# Patient Record
Sex: FEMALE | Race: WHITE | NOT HISPANIC OR LATINO | Employment: FULL TIME | URBAN - METROPOLITAN AREA
[De-identification: names, ages, dates, MRNs, and addresses within clinical notes are randomized per-mention and may not be internally consistent; named-entity substitution may affect disease eponyms.]

---

## 2017-08-04 ENCOUNTER — PATIENT OUTREACH (OUTPATIENT)
Dept: ADMINISTRATIVE | Facility: HOSPITAL | Age: 52
End: 2017-08-04

## 2017-08-04 NOTE — PROGRESS NOTES
Assisted to schedule annual exam, reminder slip mailed to home.  Well woman exams completed per GYN Dec/Jan-reports requested.

## 2017-08-04 NOTE — LETTER
August 4, 2017    Dr Yovani Hernandez MD  [address], [address], [place] [address]             [facility] [address]  [place] [address]  Phone: [phone] August 4, 2017     Patient: Mini Aleman    YOB: 1965   Date of Visit: 8/4/2017         To whom it may concern       We are seeing Mini VILLAVICENCIO Ash, YOB: 1965, at [facility]. Rafaela Liz MD is their primary care physician. To help with our [place] maintenance records could you please send the following:     Most recent Mammogram and Pap Result.      Please send fax to [phone].    Thank-you in advance for your assistance. If you have any questions or concerns, please don't hesitate to contact me at [phone].     Sara MELENDEZ LPN Care Coordination   [facility]  Phone [phone] ext [phone],  Fax [phone]

## 2017-08-30 ENCOUNTER — OFFICE VISIT (OUTPATIENT)
Dept: INTERNAL MEDICINE | Facility: CLINIC | Age: 52
End: 2017-08-30
Payer: COMMERCIAL

## 2017-08-30 VITALS
HEIGHT: 67 IN | SYSTOLIC BLOOD PRESSURE: 138 MMHG | WEIGHT: 243.19 LBS | DIASTOLIC BLOOD PRESSURE: 80 MMHG | TEMPERATURE: 99 F | HEART RATE: 67 BPM | BODY MASS INDEX: 38.17 KG/M2 | OXYGEN SATURATION: 97 %

## 2017-08-30 DIAGNOSIS — Z12.11 COLON CANCER SCREENING: ICD-10-CM

## 2017-08-30 DIAGNOSIS — Z11.59 NEED FOR HEPATITIS C SCREENING TEST: ICD-10-CM

## 2017-08-30 DIAGNOSIS — I49.5 SSS (SICK SINUS SYNDROME): ICD-10-CM

## 2017-08-30 DIAGNOSIS — E66.9 OBESITY (BMI 30-39.9): ICD-10-CM

## 2017-08-30 DIAGNOSIS — I10 ESSENTIAL HYPERTENSION: ICD-10-CM

## 2017-08-30 DIAGNOSIS — Z13.220 SCREENING FOR LIPOID DISORDERS: ICD-10-CM

## 2017-08-30 DIAGNOSIS — Z00.00 ROUTINE GENERAL MEDICAL EXAMINATION AT A HEALTH CARE FACILITY: Primary | ICD-10-CM

## 2017-08-30 DIAGNOSIS — Z13.29 THYROID DISORDER SCREEN: ICD-10-CM

## 2017-08-30 DIAGNOSIS — E88.819 INSULIN RESISTANCE: ICD-10-CM

## 2017-08-30 DIAGNOSIS — G43.009 MIGRAINE WITHOUT AURA AND WITHOUT STATUS MIGRAINOSUS, NOT INTRACTABLE: ICD-10-CM

## 2017-08-30 PROCEDURE — 99386 PREV VISIT NEW AGE 40-64: CPT | Mod: S$GLB,,, | Performed by: FAMILY MEDICINE

## 2017-08-30 PROCEDURE — 3008F BODY MASS INDEX DOCD: CPT | Mod: ,,, | Performed by: FAMILY MEDICINE

## 2017-08-30 PROCEDURE — 99999 PR PBB SHADOW E&M-EST. PATIENT-LVL III: CPT | Mod: PBBFAC,,, | Performed by: FAMILY MEDICINE

## 2017-08-30 RX ORDER — SUMATRIPTAN SUCCINATE 100 MG/1
100 TABLET ORAL
COMMUNITY
Start: 2016-05-05 | End: 2018-01-08 | Stop reason: SDUPTHER

## 2017-08-30 RX ORDER — METOPROLOL SUCCINATE 50 MG/1
50 TABLET, EXTENDED RELEASE ORAL DAILY
COMMUNITY
End: 2017-08-30

## 2017-08-30 RX ORDER — ESTRADIOL 2 MG/1
2 TABLET ORAL DAILY
COMMUNITY
End: 2018-01-08

## 2017-08-30 RX ORDER — METFORMIN HYDROCHLORIDE 1000 MG/1
1000 TABLET ORAL
COMMUNITY
End: 2018-03-07

## 2017-09-02 ENCOUNTER — LAB VISIT (OUTPATIENT)
Dept: LAB | Facility: HOSPITAL | Age: 52
End: 2017-09-02
Attending: FAMILY MEDICINE
Payer: COMMERCIAL

## 2017-09-02 DIAGNOSIS — Z13.220 SCREENING FOR LIPOID DISORDERS: ICD-10-CM

## 2017-09-02 DIAGNOSIS — E88.819 INSULIN RESISTANCE: ICD-10-CM

## 2017-09-02 DIAGNOSIS — Z13.29 THYROID DISORDER SCREEN: ICD-10-CM

## 2017-09-02 DIAGNOSIS — Z00.00 ROUTINE GENERAL MEDICAL EXAMINATION AT A HEALTH CARE FACILITY: ICD-10-CM

## 2017-09-02 PROBLEM — I49.5 SSS (SICK SINUS SYNDROME): Status: ACTIVE | Noted: 2017-09-02

## 2017-09-02 PROBLEM — G43.009 MIGRAINE WITHOUT AURA AND WITHOUT STATUS MIGRAINOSUS, NOT INTRACTABLE: Status: ACTIVE | Noted: 2017-09-02

## 2017-09-02 LAB
ALBUMIN SERPL BCP-MCNC: 3.6 G/DL
ALP SERPL-CCNC: 49 U/L
ALT SERPL W/O P-5'-P-CCNC: 13 U/L
ANION GAP SERPL CALC-SCNC: 12 MMOL/L
AST SERPL-CCNC: 16 U/L
BASOPHILS # BLD AUTO: 0.03 K/UL
BASOPHILS NFR BLD: 0.5 %
BILIRUB SERPL-MCNC: 0.4 MG/DL
BUN SERPL-MCNC: 14 MG/DL
CALCIUM SERPL-MCNC: 9.4 MG/DL
CHLORIDE SERPL-SCNC: 103 MMOL/L
CHOLEST SERPL-MCNC: 231 MG/DL
CHOLEST/HDLC SERPL: 3.3 {RATIO}
CO2 SERPL-SCNC: 27 MMOL/L
CREAT SERPL-MCNC: 0.9 MG/DL
DIFFERENTIAL METHOD: NORMAL
EOSINOPHIL # BLD AUTO: 0.1 K/UL
EOSINOPHIL NFR BLD: 1.3 %
ERYTHROCYTE [DISTWIDTH] IN BLOOD BY AUTOMATED COUNT: 13.2 %
EST. GFR  (AFRICAN AMERICAN): >60 ML/MIN/1.73 M^2
EST. GFR  (NON AFRICAN AMERICAN): >60 ML/MIN/1.73 M^2
ESTIMATED AVG GLUCOSE: 117 MG/DL
GLUCOSE SERPL-MCNC: 112 MG/DL
HBA1C MFR BLD HPLC: 5.7 %
HCT VFR BLD AUTO: 38.2 %
HDLC SERPL-MCNC: 69 MG/DL
HDLC SERPL: 29.9 %
HGB BLD-MCNC: 12.8 G/DL
LDLC SERPL CALC-MCNC: 144.8 MG/DL
LYMPHOCYTES # BLD AUTO: 2.1 K/UL
LYMPHOCYTES NFR BLD: 33.7 %
MCH RBC QN AUTO: 29.2 PG
MCHC RBC AUTO-ENTMCNC: 33.5 G/DL
MCV RBC AUTO: 87 FL
MONOCYTES # BLD AUTO: 0.4 K/UL
MONOCYTES NFR BLD: 6 %
NEUTROPHILS # BLD AUTO: 3.7 K/UL
NEUTROPHILS NFR BLD: 58.3 %
NONHDLC SERPL-MCNC: 162 MG/DL
PLATELET # BLD AUTO: 304 K/UL
PMV BLD AUTO: 10.3 FL
POTASSIUM SERPL-SCNC: 3.7 MMOL/L
PROT SERPL-MCNC: 7.1 G/DL
RBC # BLD AUTO: 4.38 M/UL
SODIUM SERPL-SCNC: 142 MMOL/L
TRIGL SERPL-MCNC: 86 MG/DL
TSH SERPL DL<=0.005 MIU/L-ACNC: 1.17 UIU/ML
WBC # BLD AUTO: 6.29 K/UL

## 2017-09-02 PROCEDURE — 84443 ASSAY THYROID STIM HORMONE: CPT

## 2017-09-02 PROCEDURE — 85025 COMPLETE CBC W/AUTO DIFF WBC: CPT

## 2017-09-02 PROCEDURE — 83036 HEMOGLOBIN GLYCOSYLATED A1C: CPT

## 2017-09-02 PROCEDURE — 80053 COMPREHEN METABOLIC PANEL: CPT

## 2017-09-02 PROCEDURE — 36415 COLL VENOUS BLD VENIPUNCTURE: CPT | Mod: PO

## 2017-09-02 PROCEDURE — 80061 LIPID PANEL: CPT

## 2017-09-03 ENCOUNTER — TELEPHONE (OUTPATIENT)
Dept: INTERNAL MEDICINE | Facility: CLINIC | Age: 52
End: 2017-09-03

## 2017-09-03 DIAGNOSIS — E78.00 PURE HYPERCHOLESTEROLEMIA: Primary | ICD-10-CM

## 2017-09-03 NOTE — PROGRESS NOTES
Subjective:       Patient ID: Mini Aleman is a 52 y.o. female.    Chief Complaint: Establish Care    Patient presents to clinic today for re-establish care physical.      Review of Systems   Constitutional: Negative for chills, fatigue, fever and unexpected weight change.   HENT: Negative for congestion, dental problem, ear pain, hearing loss, rhinorrhea and trouble swallowing.    Eyes: Negative for pain and visual disturbance.   Respiratory: Negative for cough and shortness of breath.    Cardiovascular: Positive for chest pain (per patient might be heartburn; followed by Dr. Oro, Cardiology), palpitations (chronic, stable) and leg swelling (chronic, stable).   Gastrointestinal: Negative for abdominal distention, abdominal pain, blood in stool, constipation, diarrhea, nausea and vomiting.   Genitourinary: Negative for difficulty urinating and vaginal discharge.   Musculoskeletal: Positive for arthralgias (chronic). Negative for myalgias.   Skin: Negative for rash.   Neurological: Positive for dizziness and headaches (migraines). Negative for weakness and numbness.   Hematological: Negative for adenopathy. Does not bruise/bleed easily.   Psychiatric/Behavioral: Negative for dysphoric mood and sleep disturbance. The patient is not nervous/anxious.        Objective:      Physical Exam   Constitutional: She is oriented to person, place, and time. Vital signs are normal. She appears well-developed and well-nourished. No distress.   HENT:   Head: Normocephalic and atraumatic.   Right Ear: Tympanic membrane, external ear and ear canal normal.   Left Ear: Tympanic membrane, external ear and ear canal normal.   Nose: Nose normal. No mucosal edema or rhinorrhea.   Mouth/Throat: Uvula is midline, oropharynx is clear and moist and mucous membranes are normal.   Eyes: Conjunctivae, EOM and lids are normal. Pupils are equal, round, and reactive to light.   Neck: Normal range of motion. Neck supple. No thyromegaly present.    Cardiovascular: Normal rate and regular rhythm.  Exam reveals no gallop and no friction rub.    No murmur heard.  Pulmonary/Chest: Effort normal and breath sounds normal. She has no wheezes. She has no rhonchi. She has no rales.   Abdominal: Soft. Normal appearance and bowel sounds are normal. She exhibits no distension and no mass. There is no hepatosplenomegaly. There is no tenderness.   Musculoskeletal: Normal range of motion.   Lymphadenopathy:     She has no cervical adenopathy.   Neurological: She is alert and oriented to person, place, and time. She has normal strength. No cranial nerve deficit or sensory deficit. Gait normal.   Reflex Scores:       Patellar reflexes are 2+ on the right side and 2+ on the left side.  Skin: Skin is warm and dry. No lesion and no rash noted. No cyanosis. Nails show no clubbing.   Psychiatric: She has a normal mood and affect.   Vitals reviewed.      Assessment:       1. Routine general medical examination at a health care facility    2. Essential hypertension    3. Insulin resistance    4. SSS (sick sinus syndrome)    5. Migraine without aura and without status migrainosus, not intractable    6. Obesity (BMI 30-39.9)    7. Colon cancer screening    8. Thyroid disorder screen    9. Screening for lipoid disorders    10. Need for hepatitis C screening test        Plan:     Problem List Items Addressed This Visit     Insulin resistance    Current Assessment & Plan     Patient has been off metformin x 1 year, status pending lab, will resume metformin if needed         Relevant Orders    Hemoglobin A1c    Obesity (BMI 30-39.9)    Current Assessment & Plan     Discussed the importance of weight loss in preventing chronic disease and cancers. Advised lifestyle modifications including diet and exercise to lose weight. Patient expressed understanding.           Essential hypertension    Current Assessment & Plan     Controlled on dutoprol, followed by Dr. Oro, Cardiology          Relevant Orders    Basic metabolic panel    SSS (sick sinus syndrome)    Overview     Followed by Dr. Oro, Cardiology         Migraine without aura and without status migrainosus, not intractable    Overview     Followed by Dr. Biggs, Neurology           Other Visit Diagnoses     Routine general medical examination at a health care facility    -  Primary    Relevant Orders    Comprehensive metabolic panel    CBC auto differential    Colon cancer screening        Relevant Orders    Case request GI: COLONOSCOPY (Completed)    Thyroid disorder screen        Relevant Orders    TSH    Screening for lipoid disorders        Relevant Orders    Lipid panel    Need for hepatitis C screening test        Relevant Orders    Hepatitis C antibody          Health Maintenance reviewed/updated. Advised to obtain flu vaccine this Fall. Patient will check on last Tdap.  Female health screenings per OB/GYN- Dr. Yovani Hernandez, Release signed for records.

## 2017-09-03 NOTE — ASSESSMENT & PLAN NOTE
Discussed the importance of weight loss in preventing chronic disease and cancers. Advised lifestyle modifications including diet and exercise to lose weight. Patient expressed understanding.

## 2017-09-04 NOTE — TELEPHONE ENCOUNTER
Labs show elevated blood sugar and increase in a1c, recommend we resume metformin if patient is in agreement, please send eRx to preferred pharmacy. Total cholesterol is elevated, please work on diet changes to improve this. Recommend we recheck in 6 months - please schedule cmp/lipid in 6 months.

## 2017-09-19 ENCOUNTER — TELEPHONE (OUTPATIENT)
Dept: INTERNAL MEDICINE | Facility: CLINIC | Age: 52
End: 2017-09-19

## 2017-09-19 NOTE — TELEPHONE ENCOUNTER
Unable to reach pt for lab results after several attempts. Letter mailed requesting her to contact office for results.

## 2018-01-08 ENCOUNTER — OFFICE VISIT (OUTPATIENT)
Dept: INTERNAL MEDICINE | Facility: CLINIC | Age: 53
End: 2018-01-08
Payer: COMMERCIAL

## 2018-01-08 VITALS
SYSTOLIC BLOOD PRESSURE: 130 MMHG | TEMPERATURE: 97 F | WEIGHT: 249.13 LBS | HEART RATE: 76 BPM | OXYGEN SATURATION: 96 % | HEIGHT: 67 IN | BODY MASS INDEX: 39.1 KG/M2 | DIASTOLIC BLOOD PRESSURE: 80 MMHG

## 2018-01-08 DIAGNOSIS — R05.9 COUGH: Primary | ICD-10-CM

## 2018-01-08 DIAGNOSIS — G43.009 MIGRAINE WITHOUT AURA AND WITHOUT STATUS MIGRAINOSUS, NOT INTRACTABLE: ICD-10-CM

## 2018-01-08 PROCEDURE — 99999 PR PBB SHADOW E&M-EST. PATIENT-LVL III: CPT | Mod: PBBFAC,,, | Performed by: FAMILY MEDICINE

## 2018-01-08 PROCEDURE — 99213 OFFICE O/P EST LOW 20 MIN: CPT | Mod: S$GLB,,, | Performed by: FAMILY MEDICINE

## 2018-01-08 RX ORDER — CODEINE PHOSPHATE AND GUAIFENESIN 10; 100 MG/5ML; MG/5ML
5 SOLUTION ORAL EVERY 8 HOURS PRN
Qty: 118 ML | Refills: 0 | Status: SHIPPED | OUTPATIENT
Start: 2018-01-08 | End: 2018-01-18

## 2018-01-08 RX ORDER — SUMATRIPTAN SUCCINATE 100 MG/1
100 TABLET ORAL
Qty: 9 TABLET | Refills: 1 | Status: SHIPPED | OUTPATIENT
Start: 2018-01-08 | End: 2018-03-07 | Stop reason: SDUPTHER

## 2018-01-08 RX ORDER — ESTRADIOL 1 MG/1
TABLET ORAL
Refills: 11 | COMMUNITY
Start: 2017-12-23 | End: 2022-02-26

## 2018-01-08 NOTE — PATIENT INSTRUCTIONS

## 2018-01-08 NOTE — PROGRESS NOTES
Mini Aleman  01/08/2018  [id number]    Rafaela Liz MD  Patient Care Team:  Rafaela Liz MD as PCP - General (Family Medicine)  Yovani Hernandez MD as Obstetrician (Obstetrics)  Has the patient seen any provider outside of the [facility] network since the last visit? (no). If yes, HIPPA forms completed and records requested.        Visit Type:an urgent visit for a new problem    Chief Complaint:  Chief Complaint   Patient presents with    Cough     Sneezing,Headaches,Since Wednesday,Chest Congestion,Runny Nose,Achy All Over,Ears Stopped Up,Took Nyquil,Nasal Spray    Medication Refill     Imitrex       History of Present Illness:  52 year old here for cold sx since last Wednesday, 5 days.  Cold, Congestion in chest, rhinorrhea, achy feeling.  Took OTC nyquil and OTC nasal spray.  Cough is dry, feels that its in chest  She reports wheeze with cough, but not without.  Denies SOB.  No fever.    She has history of Migraines. Neurology had followed. She is treated with prn Imitrex. Requesting refills.          History:  Past Medical History:   Diagnosis Date    Diabetes mellitus, type 2     Insulin resistance     Migraines     Sick sinus syndrome with tachycardia     followed by Dr. Oro, Cardiology annually     Past Surgical History:   Procedure Laterality Date    TOTAL ABDOMINAL HYSTERECTOMY  06/2012     Family History   Problem Relation Age of Onset    Cancer Mother      non hodskins lymphoma    Diabetes Mother     Diabetes Father     Heart disease Father     Birth defects Father     Diabetes Sister     No Known Problems Brother      Social History     Social History    Marital status:      Spouse name: N/A    Number of children: N/A    Years of education: N/A     Occupational History     [employer]      Social History Main Topics    Smoking status: Never Smoker    Smokeless tobacco: Never Used    Alcohol use No    Drug use: No    Sexual activity: Yes      Other Topics Concern    Not on file     Social History Narrative    No narrative on file     Patient Active Problem List   Diagnosis    Insulin resistance    Obesity (BMI 30-39.9)    Essential hypertension    SSS (sick sinus syndrome)    Migraine without aura and without status migrainosus, not intractable     Review of patient's allergies indicates:   Allergen Reactions    Cortisone Rash       The following were reviewed at this visit: active problem list, medication list, allergies, family history, social history, and health maintenance.    Medications:  Current Outpatient Prescriptions on File Prior to Visit   Medication Sig Dispense Refill    metoprolol dhillon-hydrochlorothiaz (DUTOPROL) 25-12.5 mg Tb24 Take 0.5 tablets by mouth once daily.      [DISCONTINUED] estradiol (ESTRACE) 2 MG tablet Take 2 mg by mouth once daily.      [DISCONTINUED] sumatriptan (IMITREX) 100 MG tablet Take 100 mg by mouth as needed.      metformin (GLUCOPHAGE) 1000 MG tablet Take 1,000 mg by mouth.       No current facility-administered medications on file prior to visit.        Medications have been reviewed and reconciled with patient at this visit.  Barriers to medications present (no)    Adverse reactions to current medications (no)    Over the counter medications reviewed (Yes ), and if needed added to active Medication list at this visit.     Exam:  Wt Readings from Last 3 Encounters:   01/08/18 113 kg (249 lb 1.6 oz)   08/30/17 110.3 kg (243 lb 2.7 oz)   08/01/14 107.7 kg (237 lb 6.4 oz)     Temp Readings from Last 3 Encounters:   01/08/18 97.4 °F (36.3 °C) (Tympanic)   08/30/17 98.5 °F (36.9 °C) (Tympanic)   08/01/14 97.1 °F (36.2 °C) (Tympanic)     BP Readings from Last 3 Encounters:   01/08/18 130/80   08/30/17 138/80   08/01/14 128/76     Pulse Readings from Last 3 Encounters:   01/08/18 76   08/30/17 67   08/01/14 80     Body mass index is 39.01 kg/m².      Review of Systems   Constitutional: Positive for chills.  Negative for fever.   HENT: Positive for congestion. Negative for sinus pain and sore throat.    Eyes: Negative for blurred vision and double vision.   Respiratory: Positive for cough and sputum production. Negative for shortness of breath and wheezing.    Cardiovascular: Negative for chest pain, palpitations and leg swelling.   Gastrointestinal: Negative for abdominal pain, constipation, diarrhea, heartburn, nausea and vomiting.   Genitourinary: Negative.    Musculoskeletal: Negative.    Skin: Negative.  Negative for rash.   Neurological: Negative.    Endo/Heme/Allergies: Negative.  Negative for polydipsia. Does not bruise/bleed easily.   Psychiatric/Behavioral: Negative for depression and substance abuse.       Physical Exam   Constitutional: She is oriented to person, place, and time. She appears well-developed and well-nourished. No distress.   HENT:   Head: Normocephalic and atraumatic.   Right Ear: External ear normal.   Left Ear: External ear normal.   Nose: Nose normal.   Mouth/Throat: Oropharynx is clear and moist. No oropharyngeal exudate.   Eyes: Conjunctivae and EOM are normal. Pupils are equal, round, and reactive to light. Right eye exhibits no discharge. Left eye exhibits no discharge.   Neck: Normal range of motion. Neck supple. No thyromegaly present.   Cardiovascular: Normal rate, regular rhythm, normal heart sounds and intact distal pulses.    No murmur heard.  Pulmonary/Chest: Effort normal and breath sounds normal. No respiratory distress. She has no wheezes.   Abdominal: Soft. Bowel sounds are normal. She exhibits no distension and no mass. There is no tenderness.   Musculoskeletal: Normal range of motion. She exhibits no edema.   Lymphadenopathy:     She has no cervical adenopathy.   Neurological: She is alert and oriented to person, place, and time. No cranial nerve deficit.   Skin: Capillary refill takes less than 2 seconds. She is not diaphoretic.   Psychiatric: She has a normal mood and  affect. Her behavior is normal. Judgment and thought content normal.   Nursing note and vitals reviewed.      Laboratory Reviewed ({N/A)  Lab Results   Component Value Date    WBC 6.29 09/02/2017    HGB 12.8 09/02/2017    HCT 38.2 09/02/2017     09/02/2017    CHOL 231 (H) 09/02/2017    TRIG 86 09/02/2017    HDL 69 09/02/2017    ALT 13 09/02/2017    AST 16 09/02/2017     09/02/2017    K 3.7 09/02/2017     09/02/2017    CREATININE 0.9 09/02/2017    BUN 14 09/02/2017    CO2 27 09/02/2017    TSH 1.174 09/02/2017    HGBA1C 5.7 (H) 09/02/2017       Assessment:  The primary encounter diagnosis was Cough. A diagnosis of Migraine without aura and without status migrainosus, not intractable was also pertinent to this visit.     Plan       Cough  -     guaifenesin-codeine 100-10 mg/5 ml (TUSSI-ORGANIDIN NR)  mg/5 mL syrup; Take 5 mLs by mouth every 8 (eight) hours as needed for Cough.  Dispense: 118 mL; Refill: 0    Migraine without aura and without status migrainosus, not intractable  -     sumatriptan (IMITREX) 100 MG tablet; Take 1 tablet (100 mg total) by mouth as needed.  Dispense: 9 tablet; Refill: 1      Care Plan/Goals: Reviewed  (N/A)  Goals     None          Follow up: Return if symptoms worsen or fail to improve.    After visit summary was printed and given to patient upon discharge today.  Patient goals and care plan are included in After Visit Summary.   Statement Selected

## 2018-02-21 ENCOUNTER — PATIENT OUTREACH (OUTPATIENT)
Dept: ADMINISTRATIVE | Facility: HOSPITAL | Age: 53
End: 2018-02-21

## 2018-03-03 ENCOUNTER — LAB VISIT (OUTPATIENT)
Dept: LAB | Facility: HOSPITAL | Age: 53
End: 2018-03-03
Attending: FAMILY MEDICINE
Payer: COMMERCIAL

## 2018-03-03 DIAGNOSIS — E88.819 INSULIN RESISTANCE: ICD-10-CM

## 2018-03-03 DIAGNOSIS — I10 ESSENTIAL HYPERTENSION: ICD-10-CM

## 2018-03-03 LAB
ANION GAP SERPL CALC-SCNC: 9 MMOL/L
BUN SERPL-MCNC: 13 MG/DL
CALCIUM SERPL-MCNC: 9.4 MG/DL
CHLORIDE SERPL-SCNC: 103 MMOL/L
CO2 SERPL-SCNC: 28 MMOL/L
CREAT SERPL-MCNC: 0.8 MG/DL
EST. GFR  (AFRICAN AMERICAN): >60 ML/MIN/1.73 M^2
EST. GFR  (NON AFRICAN AMERICAN): >60 ML/MIN/1.73 M^2
ESTIMATED AVG GLUCOSE: 120 MG/DL
GLUCOSE SERPL-MCNC: 116 MG/DL
HBA1C MFR BLD HPLC: 5.8 %
POTASSIUM SERPL-SCNC: 3.9 MMOL/L
SODIUM SERPL-SCNC: 140 MMOL/L

## 2018-03-03 PROCEDURE — 80048 BASIC METABOLIC PNL TOTAL CA: CPT

## 2018-03-03 PROCEDURE — 36415 COLL VENOUS BLD VENIPUNCTURE: CPT | Mod: PO

## 2018-03-03 PROCEDURE — 83036 HEMOGLOBIN GLYCOSYLATED A1C: CPT

## 2018-03-07 ENCOUNTER — OFFICE VISIT (OUTPATIENT)
Dept: INTERNAL MEDICINE | Facility: CLINIC | Age: 53
End: 2018-03-07
Payer: COMMERCIAL

## 2018-03-07 VITALS
HEIGHT: 67 IN | SYSTOLIC BLOOD PRESSURE: 130 MMHG | OXYGEN SATURATION: 96 % | HEART RATE: 82 BPM | TEMPERATURE: 98 F | WEIGHT: 251.75 LBS | DIASTOLIC BLOOD PRESSURE: 80 MMHG | BODY MASS INDEX: 39.51 KG/M2

## 2018-03-07 DIAGNOSIS — Z12.11 COLON CANCER SCREENING: ICD-10-CM

## 2018-03-07 DIAGNOSIS — E66.9 OBESITY (BMI 30-39.9): ICD-10-CM

## 2018-03-07 DIAGNOSIS — Z23 NEED FOR TETANUS BOOSTER: ICD-10-CM

## 2018-03-07 DIAGNOSIS — Z11.59 ENCOUNTER FOR HEPATITIS C SCREENING TEST FOR LOW RISK PATIENT: Primary | ICD-10-CM

## 2018-03-07 DIAGNOSIS — I10 ESSENTIAL HYPERTENSION: ICD-10-CM

## 2018-03-07 DIAGNOSIS — G43.009 MIGRAINE WITHOUT AURA AND WITHOUT STATUS MIGRAINOSUS, NOT INTRACTABLE: ICD-10-CM

## 2018-03-07 PROCEDURE — 90471 IMMUNIZATION ADMIN: CPT | Mod: S$GLB,,, | Performed by: FAMILY MEDICINE

## 2018-03-07 PROCEDURE — 99214 OFFICE O/P EST MOD 30 MIN: CPT | Mod: 25,S$GLB,, | Performed by: NURSE PRACTITIONER

## 2018-03-07 PROCEDURE — 99999 PR PBB SHADOW E&M-EST. PATIENT-LVL III: CPT | Mod: PBBFAC,,, | Performed by: NURSE PRACTITIONER

## 2018-03-07 PROCEDURE — 3075F SYST BP GE 130 - 139MM HG: CPT | Mod: S$GLB,,, | Performed by: NURSE PRACTITIONER

## 2018-03-07 PROCEDURE — 3079F DIAST BP 80-89 MM HG: CPT | Mod: S$GLB,,, | Performed by: NURSE PRACTITIONER

## 2018-03-07 PROCEDURE — 90715 TDAP VACCINE 7 YRS/> IM: CPT | Mod: S$GLB,,, | Performed by: FAMILY MEDICINE

## 2018-03-07 RX ORDER — FLUTICASONE FUROATE AND VILANTEROL TRIFENATATE 100; 25 UG/1; UG/1
POWDER RESPIRATORY (INHALATION)
Refills: 0 | COMMUNITY
Start: 2018-02-01 | End: 2021-03-03

## 2018-03-07 RX ORDER — SUMATRIPTAN SUCCINATE 100 MG/1
100 TABLET ORAL
Qty: 9 TABLET | Refills: 1 | Status: SHIPPED | OUTPATIENT
Start: 2018-03-07 | End: 2023-10-03 | Stop reason: SDUPTHER

## 2018-03-07 NOTE — PROGRESS NOTES
Mini Aleman  03/07/2018  [id number]    Rafaela Liz MD  Patient Care Team:  Rafaela Liz MD as PCP - General (Family Medicine)  Yovani Hernandez MD as Obstetrician (Obstetrics)  Has the patient seen any provider outside of the [facility] network since the last visit? (no). If yes, HIPPA forms completed and records requested.        Visit Type:a scheduled routine follow-up visit    Chief Complaint:  Chief Complaint   Patient presents with    6 month follow up       History of Present Illness:    Patient presents to clinic today for follow up of chronic conditions including HTN and migraine. She is otherwise without concern.    History:  Past Medical History:   Diagnosis Date    Diabetes mellitus, type 2     Insulin resistance     Migraines     Sick sinus syndrome with tachycardia     followed by Dr. Oro, Cardiology annually     Past Surgical History:   Procedure Laterality Date    TOTAL ABDOMINAL HYSTERECTOMY  06/2012     Family History   Problem Relation Age of Onset    Cancer Mother      non hodskins lymphoma    Diabetes Mother     Diabetes Father     Heart disease Father     Birth defects Father     Diabetes Sister     No Known Problems Brother      Social History     Social History    Marital status:      Spouse name: N/A    Number of children: 2    Years of education: N/A     Occupational History     [employer]      Social History Main Topics    Smoking status: Never Smoker    Smokeless tobacco: Never Used    Alcohol use No    Drug use: No    Sexual activity: Yes     Other Topics Concern    Not on file     Social History Narrative    No narrative on file     Patient Active Problem List   Diagnosis    Insulin resistance    Obesity (BMI 30-39.9)    Essential hypertension    SSS (sick sinus syndrome)    Migraine without aura and without status migrainosus, not intractable     Review of patient's allergies indicates:   Allergen Reactions     Cortisone Rash       The following were reviewed at this visit: active problem list, medication list, allergies, family history, social history, and health maintenance.    Medications:  Current Outpatient Prescriptions on File Prior to Visit   Medication Sig Dispense Refill    estradiol (ESTRACE) 1 MG tablet   11    metoprolol dhillon-hydrochlorothiaz (DUTOPROL) 25-12.5 mg Tb24 Take 0.5 tablets by mouth once daily.      [DISCONTINUED] sumatriptan (IMITREX) 100 MG tablet Take 1 tablet (100 mg total) by mouth as needed. 9 tablet 1    [DISCONTINUED] metformin (GLUCOPHAGE) 1000 MG tablet Take 1,000 mg by mouth.       No current facility-administered medications on file prior to visit.        Medications have been reviewed and reconciled with patient at this visit.  Barriers to medications present (no)    Adverse reactions to current medications (yes)    Over the counter medications reviewed (Yes ), and if needed added to active Medication list at this visit.     Exam:  Wt Readings from Last 3 Encounters:   03/07/18 114.2 kg (251 lb 12.3 oz)   01/08/18 113 kg (249 lb 1.6 oz)   08/30/17 110.3 kg (243 lb 2.7 oz)     Temp Readings from Last 3 Encounters:   03/07/18 98 °F (36.7 °C) (Tympanic)   01/08/18 97.4 °F (36.3 °C) (Tympanic)   08/30/17 98.5 °F (36.9 °C) (Tympanic)     BP Readings from Last 3 Encounters:   03/07/18 130/80   01/08/18 130/80   08/30/17 138/80     Pulse Readings from Last 3 Encounters:   03/07/18 82   01/08/18 76   08/30/17 67     Body mass index is 39.43 kg/m².    Review of Systems   Constitutional: Negative for chills and fever.   HENT: Negative for congestion and sore throat.    Respiratory: Negative for cough.    Cardiovascular: Negative for chest pain and leg swelling.   Musculoskeletal: Negative for myalgias.   Skin: Negative for rash.   Neurological: Positive for headaches (chronic, stable). Negative for weakness.         Physical Exam   Constitutional: She is oriented to person, place, and time.  She appears well-developed and well-nourished. No distress.   HENT:   Head: Normocephalic and atraumatic.   Eyes: Conjunctivae and EOM are normal. Pupils are equal, round, and reactive to light.   Cardiovascular: Normal rate and regular rhythm.  Exam reveals no gallop and no friction rub.    No murmur heard.  Pulmonary/Chest: Effort normal and breath sounds normal.   Neurological: She is alert and oriented to person, place, and time.   Skin: Skin is warm and dry.   Psychiatric: She has a normal mood and affect.   Vitals reviewed.      Laboratory Reviewed ({Yes)  Lab Results   Component Value Date    WBC 6.29 09/02/2017    HGB 12.8 09/02/2017    HCT 38.2 09/02/2017     09/02/2017    CHOL 231 (H) 09/02/2017    TRIG 86 09/02/2017    HDL 69 09/02/2017    ALT 13 09/02/2017    AST 16 09/02/2017     03/03/2018    K 3.9 03/03/2018     03/03/2018    CREATININE 0.8 03/03/2018    BUN 13 03/03/2018    CO2 28 03/03/2018    TSH 1.174 09/02/2017    HGBA1C 5.8 (H) 03/03/2018       Assessment:  The primary encounter diagnosis was Encounter for hepatitis C screening test for low risk patient. Diagnoses of Colon cancer screening, Need for tetanus booster, Migraine without aura and without status migrainosus, not intractable, Essential hypertension, and Obesity (BMI 30-39.9) were also pertinent to this visit.     Plan  Obesity (BMI 30-39.9)  Stable, recently started WW, will resume exercise    Essential hypertension  Controlled, continue metoprolol    Migraine without aura and without status migrainosus, not intractable  Stable, refill imitrex    Ordered colonoscopy.  Patient to have hep c screening with next routine lab draw.  tdap today.  Labs reviewed, questions answered.  Care Plan/Goals: Reviewed  (N/A)  Goals     None          Follow up: Follow-up in about 6 months (around 9/7/2018), or if symptoms worsen or fail to improve, for annual wellness/EPP with Dr. Liz.    After visit summary was printed and given  to patient upon discharge today.  Patient goals and care plan are included in After Visit Summary.

## 2018-03-12 ENCOUNTER — DOCUMENTATION ONLY (OUTPATIENT)
Dept: ENDOSCOPY | Facility: HOSPITAL | Age: 53
End: 2018-03-12

## 2018-07-18 ENCOUNTER — TELEPHONE (OUTPATIENT)
Dept: INTERNAL MEDICINE | Facility: CLINIC | Age: 53
End: 2018-07-18

## 2018-07-18 DIAGNOSIS — Z12.11 COLON CANCER SCREENING: Primary | ICD-10-CM

## 2018-07-18 NOTE — TELEPHONE ENCOUNTER
----- Message from Chacha Blackwood NP sent at 7/2/2018  2:22 PM CDT -----  Regarding: colonoscopy  Patient has not had colonoscopy. If no family history of colon ca, she is a candidate for yearly fitkit. Please contact to see if she is agreeable.

## 2018-07-23 ENCOUNTER — TELEPHONE (OUTPATIENT)
Dept: INTERNAL MEDICINE | Facility: CLINIC | Age: 53
End: 2018-07-23

## 2018-07-23 NOTE — TELEPHONE ENCOUNTER
Left message on patients voicemail advising that the FitKit is ready for pickup. Advised to call the office as needed.

## 2018-07-23 NOTE — TELEPHONE ENCOUNTER
----- Message from Loyd Rodgers sent at 7/23/2018  1:18 PM CDT -----  Contact: Pt.   Pt is returning missed call. .[phone] (vcjo)

## 2018-08-09 ENCOUNTER — LAB VISIT (OUTPATIENT)
Dept: LAB | Facility: HOSPITAL | Age: 53
End: 2018-08-09
Payer: COMMERCIAL

## 2018-08-09 DIAGNOSIS — Z12.11 COLON CANCER SCREENING: ICD-10-CM

## 2018-08-09 LAB — HEMOCCULT STL QL IA: NEGATIVE

## 2018-08-09 PROCEDURE — 82274 ASSAY TEST FOR BLOOD FECAL: CPT

## 2018-10-15 ENCOUNTER — OFFICE VISIT (OUTPATIENT)
Dept: INTERNAL MEDICINE | Facility: CLINIC | Age: 53
End: 2018-10-15
Payer: COMMERCIAL

## 2018-10-15 ENCOUNTER — HOSPITAL ENCOUNTER (OUTPATIENT)
Dept: RADIOLOGY | Facility: HOSPITAL | Age: 53
Discharge: HOME | End: 2018-10-15
Attending: NURSE PRACTITIONER
Payer: COMMERCIAL

## 2018-10-15 ENCOUNTER — OFFICE VISIT (OUTPATIENT)
Dept: ORTHOPEDICS | Facility: CLINIC | Age: 53
End: 2018-10-15
Payer: COMMERCIAL

## 2018-10-15 VITALS
OXYGEN SATURATION: 96 % | DIASTOLIC BLOOD PRESSURE: 78 MMHG | HEART RATE: 84 BPM | WEIGHT: 252.44 LBS | TEMPERATURE: 98 F | HEIGHT: 67 IN | BODY MASS INDEX: 39.62 KG/M2 | SYSTOLIC BLOOD PRESSURE: 130 MMHG

## 2018-10-15 VITALS
WEIGHT: 252.44 LBS | BODY MASS INDEX: 39.62 KG/M2 | HEART RATE: 85 BPM | HEIGHT: 67 IN | SYSTOLIC BLOOD PRESSURE: 147 MMHG | DIASTOLIC BLOOD PRESSURE: 82 MMHG | RESPIRATION RATE: 12 BRPM

## 2018-10-15 DIAGNOSIS — M25.512 ACUTE PAIN OF LEFT SHOULDER: Primary | ICD-10-CM

## 2018-10-15 DIAGNOSIS — M25.512 ACUTE PAIN OF LEFT SHOULDER: ICD-10-CM

## 2018-10-15 DIAGNOSIS — M75.82 ROTATOR CUFF TENDINITIS, LEFT: Primary | ICD-10-CM

## 2018-10-15 PROCEDURE — 3075F SYST BP GE 130 - 139MM HG: CPT | Mod: CPTII,S$GLB,, | Performed by: NURSE PRACTITIONER

## 2018-10-15 PROCEDURE — 3074F SYST BP LT 130 MM HG: CPT | Mod: CPTII,S$GLB,, | Performed by: FAMILY MEDICINE

## 2018-10-15 PROCEDURE — 3079F DIAST BP 80-89 MM HG: CPT | Mod: CPTII,S$GLB,, | Performed by: FAMILY MEDICINE

## 2018-10-15 PROCEDURE — 99214 OFFICE O/P EST MOD 30 MIN: CPT | Mod: S$GLB,,, | Performed by: NURSE PRACTITIONER

## 2018-10-15 PROCEDURE — 99999 PR PBB SHADOW E&M-EST. PATIENT-LVL III: CPT | Mod: PBBFAC,,, | Performed by: FAMILY MEDICINE

## 2018-10-15 PROCEDURE — 99213 OFFICE O/P EST LOW 20 MIN: CPT | Mod: 25,S$GLB,, | Performed by: FAMILY MEDICINE

## 2018-10-15 PROCEDURE — 3008F BODY MASS INDEX DOCD: CPT | Mod: CPTII,S$GLB,, | Performed by: NURSE PRACTITIONER

## 2018-10-15 PROCEDURE — 3008F BODY MASS INDEX DOCD: CPT | Mod: CPTII,S$GLB,, | Performed by: FAMILY MEDICINE

## 2018-10-15 PROCEDURE — 20610 DRAIN/INJ JOINT/BURSA W/O US: CPT | Mod: LT,S$GLB,, | Performed by: FAMILY MEDICINE

## 2018-10-15 PROCEDURE — 73030 X-RAY EXAM OF SHOULDER: CPT | Mod: 26,LT,, | Performed by: RADIOLOGY

## 2018-10-15 PROCEDURE — 3078F DIAST BP <80 MM HG: CPT | Mod: CPTII,S$GLB,, | Performed by: NURSE PRACTITIONER

## 2018-10-15 PROCEDURE — 73030 X-RAY EXAM OF SHOULDER: CPT | Mod: TC,LT

## 2018-10-15 PROCEDURE — 99999 PR PBB SHADOW E&M-EST. PATIENT-LVL IV: CPT | Mod: PBBFAC,,, | Performed by: NURSE PRACTITIONER

## 2018-10-15 RX ORDER — METHOCARBAMOL 500 MG/1
500 TABLET, FILM COATED ORAL 4 TIMES DAILY
Qty: 40 TABLET | Refills: 0 | Status: SHIPPED | OUTPATIENT
Start: 2018-10-15 | End: 2018-10-25

## 2018-10-15 RX ORDER — TRIAMCINOLONE ACETONIDE 40 MG/ML
80 INJECTION, SUSPENSION INTRA-ARTICULAR; INTRAMUSCULAR
Status: DISCONTINUED | OUTPATIENT
Start: 2018-10-15 | End: 2018-10-15 | Stop reason: HOSPADM

## 2018-10-15 RX ADMIN — TRIAMCINOLONE ACETONIDE 80 MG: 40 INJECTION, SUSPENSION INTRA-ARTICULAR; INTRAMUSCULAR at 11:10

## 2018-10-15 NOTE — PROCEDURES
Large Joint Aspiration/Injection: L subacromial bursa  Date/Time: 10/15/2018 11:39 AM  Performed by: Alex Neff MD  Authorized by: Alex Neff MD     Consent Done?:  Yes (Verbal)  Indications:  Pain  Procedure site marked: Yes    Timeout: Prior to procedure the correct patient, procedure, and site was verified      Location:  Shoulder  Site:  L subacromial bursa  Prep: Patient was prepped and draped in usual sterile fashion    Ultrasonic Guidance for needle placement: No  Needle size:  25 G  Approach:  Posterior  Medications:  80 mg triamcinolone acetonide 40 mg/mL  Aspirate amount (ml):  0  Patient tolerance:  Patient tolerated the procedure well with no immediate complications    Additional Comments: Patient experienced minimal blood loss (< 3 cc) and clean bandage was applied. Post procedure care was provided to the patient verbally and with their AVS. Patient was instructed to take it easy for the next 24-48 hours and was informed that their pain may increase once the anaesthesia wears off and before the steroid begins to work. Patient was instructed to ice area for 15 minutes and may take NSAIDs or Tylenol PRN if pain worsens. Patient was informed to contact clinic or go to the ED if they develop any fever, chills, redness, swelling, or other complications.

## 2018-10-15 NOTE — LETTER
October 15, 2018      Chacha Blackwood NP  [address] [place] [address] Dr [address]ash [place] [id number]           [facility]  [address] [place] [address] [place] [address]  Phone: [phone]  Fax: [phone]          Patient: Mini Aleman   MR Number: [id number]   YOB: 1965   Date of Visit: 10/15/2018       Dear Chacha Blackwood:    Thank you for referring Mini Aleman to me for evaluation. Attached you will find relevant portions of my assessment and plan of care.    If you have questions, please do not hesitate to call me. I look forward to following Mini Aleman along with you.    Sincerely,    Alex Neff MD    Enclosure  CC:  No Recipients    If you would like to receive this communication electronically, please contact [email or web address] or [phone] to request more information on [employer] Link access.    For providers and/or their staff who would like to refer a patient to [facility], please contact us through our one-stop-shop provider referral line, [facility] , at [phone].    If you feel you have received this communication in error or would no longer like to receive these types of communications, please e-mail [email or web address]

## 2018-10-15 NOTE — PROGRESS NOTES
Subjective:     Patient ID: Mini Aleman is a 53 y.o. female.    Chief Complaint: Pain of the Left Shoulder    The patient is a 53-year-old left-hand dominant female presents to clinic today complaining of left shoulder pain for the past 4 days.  The patient states that she was trying to move a heavy table 4 days prior when she noted that her shoulder fielt very painful afterwards.  The patient states that she has had some dull aches in her shoulders in the past, but nothing like after trying to move the table.  Patient denies any snapping or tearing sensations, but states her shoulder is been difficult to sleep on and move.  States that her range of motion is improved some over the last 2 days, but she is not able to get it above her head.  She has been alternating Motrin and Aleve with moderate improvement.  Patient has never injured this shoulder before.  Denies any history of injections, physical therapy, or surgeries.  Denies any numbness or tingling.  Denies any neck pain or stiffness.        Past Medical History:   Diagnosis Date    Diabetes mellitus, type 2     Insulin resistance     Migraines     Sick sinus syndrome with tachycardia     followed by Dr. Oro, Cardiology annually     Past Surgical History:   Procedure Laterality Date    TOTAL ABDOMINAL HYSTERECTOMY  06/2012     Family History   Problem Relation Age of Onset    Cancer Mother         non hodskins lymphoma    Diabetes Mother     Diabetes Father     Heart disease Father     Birth defects Father     Diabetes Sister     No Known Problems Brother      Social History     Socioeconomic History    Marital status:      Spouse name: Not on file    Number of children: 2    Years of education: Not on file    Highest education level: Not on file   Social Needs    Financial resource strain: Not on file    Food insecurity - worry: Not on file    Food insecurity - inability: Not on file    Transportation needs - medical: Not on  file    Transportation needs - non-medical: Not on file   Occupational History     Employer: [employer]    Tobacco Use    Smoking status: Never Smoker    Smokeless tobacco: Never Used   Substance and Sexual Activity    Alcohol use: No    Drug use: No    Sexual activity: Yes   Other Topics Concern    Not on file   Social History Narrative    Not on file        Medication List           Accurate as of 10/15/18 12:21 PM. If you have any questions, ask your nurse or doctor.               START taking these medications    methocarbamol 500 MG Tab  Commonly known as:  ROBAXIN  Take 1 tablet (500 mg total) by mouth 4 (four) times daily. for 10 days  Started by:  Chacha Blackwood NP        CONTINUE taking these medications    BREO ELLIPTA 100-25 mcg/dose diskus inhaler  Generic drug:  fluticasone-vilanterol     DUTOPROL 25-12.5 mg Tb24  Generic drug:  metoprolol dhillon-hydrochlorothiaz     estradiol 1 MG tablet  Commonly known as:  ESTRACE     sumatriptan 100 MG tablet  Commonly known as:  IMITREX  Take 1 tablet (100 mg total) by mouth as needed.           Where to Get Your Medications      These medications were sent to [facility] [address] [place] [address] [address] [facility]  [address], [facility] [address]    Phone:  [phone]   · methocarbamol 500 MG Tab       Review of patient's allergies indicates:   Allergen Reactions    Cortisone Rash     Review of Systems   Constitutional: Negative for chills and fever.   Cardiovascular: Negative for leg swelling.   Gastrointestinal: Negative for nausea and vomiting.   Musculoskeletal: Positive for joint pain. Negative for back pain, falls and myalgias.   Skin: Negative for rash.   Neurological: Negative for tingling, sensory change, focal weakness and weakness.        Objective:   Body mass index is 39.54 kg/m².  Vitals:    10/15/18 1101   BP: (!) 147/82   Pulse: 85   Resp: 12   Weight:  "114.5 kg (252 lb 6.8 oz)   Height: 5' 7" (1.702 m)   PainSc:   8   PainLoc: Shoulder           General    Nursing note and vitals reviewed.  Constitutional: She is oriented to person, place, and time. She appears well-developed and well-nourished. No distress.   Eyes: Conjunctivae are normal. No scleral icterus.   Pulmonary/Chest: Effort normal.   Neurological: She is alert and oriented to person, place, and time.   Psychiatric: She has a normal mood and affect. Her behavior is normal. Judgment and thought content normal.         Right Shoulder Exam   Right shoulder exam is normal.    Inspection/Observation   Swelling: absent  Deformity: absent  Scapular Winging: absent  Scapular Dyskinesia: negative  Atrophy: absent    Tenderness   The patient is experiencing no tenderness.    Range of Motion   Active abduction: normal   Passive abduction: normal   Extension: normal   Forward Flexion: normal   Forward Elevation: normal  Adduction: normal    Tests & Signs   Mccracken test: negative  Impingement: negative  Active Compression Test (Daviess's Sign): negative    Other   Sensation: normal    Left Shoulder Exam   Left shoulder exam is normal.    Inspection/Observation   Swelling: absent  Deformity: absent  Scapular Winging: absent  Scapular Dyskinesia: negative  Atrophy: absent    Tenderness   The patient is tender to palpation of the greater tuberosity and biceps tendon.    Tests & Signs   Mccracken test: negative  Impingement: negative  Rotator Cuff Painful Arc/Range: moderate  Active Compression Test (Daviess's Sign): positive  Speed's Test: negative    Other   Sensation: normal     Comments:  Positive Naga's; patient has full active and passive internal and external range of motion, but it is very painful for her and it takes a lot of effort for her to get there      Muscle Strength   Right Upper Extremity   Shoulder Abduction: 5/5   Shoulder Internal Rotation: 5/5   Shoulder External Rotation: 5/5   Supraspinatus: 5/5/5 "   Subscapularis: 5/5/5   Biceps: 5/5/5       Narrative     EXAMINATION:  XR SHOULDER COMPLETE 2 OR MORE VIEWS LEFT    CLINICAL HISTORY:  Pain in left shoulder    TECHNIQUE:  Two or three views of the left shoulder were performed.    COMPARISON:  None    FINDINGS:  Prominent left rotator cuff tendinitis is noted.  AC joint arthrosis is also present.  Very mild degenerative changes at the glenohumeral joint.  No acute fracture or dislocation is seen.  Visualized left lung appears clear.      Impression       As above      Electronically signed by: Emerson Gauthier MD  Date: 10/15/2018  Time: 11:49           Mini was seen today for pain.    Diagnoses and all orders for this visit:    Rotator cuff tendinitis, left  -     Ambulatory Referral to Physical/Occupational Therapy  -     Large Joint Aspiration/Injection: L subacromial bursa    -after discussing options with patient, patient has elected to take a more conservative route.  Patient felt some immediate relief after cortisone injection today.  -will do physical therapy for twice a week for 6 weeks  -left shoulder 3 view Xray images were independently viewed and read by me showing significant calcific changes in the rotator cuff.  Some mild AC joint degenerative changes.  No other acute or chronic changes noted. No acute fractures dislocations.  -Formal read by radiologist is as described above  -Discussed findings with patient  -Treatment options and alternatives were discussed with the patient. Patient expressed understanding. Patient was given the opportunity to ask questions and be an active participant in their medical care. Patient had no further questions or concerns at this time.   -Patient is an overall moderate risk for health complications from their medical conditions.

## 2018-10-15 NOTE — PATIENT INSTRUCTIONS
Understanding Rotator Cuff Tendonitis    Tendons are tough tissues that connect muscles to bone. A group of 4 muscles and their tendons form a cuff around the head of the upper arm bone. This is called the rotator cuff. It connects the upper arm to the shoulder blade. It gives the shoulder joint stability and strength.  If tendons are injured or strained, they may become irritated and swollen (inflamed). This is called tendonitis. Rotator cuff tendonitis may cause shoulder pain and loss of function.  What causes rotator cuff tendonitis?  Tendonitis results when the rotator cuff tendons are injured or overworked. The most common cause of injury is repetitive overhead activities. These can be work-related activities such as reaching, pushing, or lifting. Or they can be sports-related activities such as throwing, swimming, or lifting weights.  Symptoms of rotator cuff tendonitis  Pain on the side of the upper arm is the most common symptom. Pain may get worse with overhead movements or when you raise the arm above shoulder level. It may also hurt to lie on the shoulder at night.  Treatment for rotator cuff tendonitis  Treatment may include the following:  · Active rest. This lets the rotator cuff heal. Active rest means using your arm and shoulder, but avoiding activities that cause pain, such as reaching overhead or sleeping on the shoulder.  · Cold packs. Putting ice packs on the shoulder helps reduce swelling and relieve pain.  · Pain medicines. Prescription or over-the-counter pain medicines can help relieve pain and swelling.  · Arm and shoulder exercises. These help keep the shoulder joint mobile as it heals. They also help improve the strength of muscles around the joint.  Possible complications  It might be tempting to stop using your shoulder completely to avoid pain. But doing so may lead to a condition called frozen shoulder. To help prevent this, following instructions you are given for active rest  and for doing exercises to help your shoulder heal.  When to call your healthcare provider  Call your healthcare provider right away if you have any of these:  · Fever of 100.4°F (38°C) or higher, or as directed  · Symptoms that dont get better, or get worse  · New symptoms   Date Last Reviewed: 3/10/2016  © 4808-9533 [employer]. [address] [place] [address], [place] [address]. All rights reserved. This information is not intended as a substitute for professional medical care. Always follow your healthcare professional's instructions.

## 2018-10-15 NOTE — PROGRESS NOTES
"Subjective:       Patient ID: Mini Aleman is a 53 y.o. female.    Chief Complaint: left shoulder pain (x4 days)    Patient presents with left shoulder pain that began 10/11 after lifting table with three other people. She states this shoulder was "not 100%" at baseline.       Shoulder Pain    The pain is present in the left shoulder. This is a new problem. The current episode started in the past 7 days. There has been no history of extremity trauma. The problem occurs constantly. The problem has been unchanged. The quality of the pain is described as aching (throbbing). The pain is at a severity of 8/10. Associated symptoms include a limited range of motion, numbness (in hand) and tingling. Pertinent negatives include no fever. She has tried NSAIDS (blue emu) for the symptoms. The treatment provided mild relief.     Review of Systems   Constitutional: Negative for chills and fever.   Cardiovascular: Negative for leg swelling.   Musculoskeletal: Positive for arthralgias. Negative for back pain, gait problem, joint swelling, neck pain and neck stiffness.   Skin: Negative for wound.   Neurological: Positive for tingling, weakness (LUE) and numbness (in hand). Negative for dizziness, tremors, syncope and facial asymmetry.       Objective:      Physical Exam   Constitutional: She appears well-developed and well-nourished. No distress.   HENT:   Head: Normocephalic and atraumatic.   Musculoskeletal:        Left shoulder: She exhibits decreased range of motion, tenderness, pain and decreased strength. She exhibits no bony tenderness, no swelling, no effusion, no crepitus, no deformity and normal pulse.        Arms:  Neurological: She is alert.   Skin: She is not diaphoretic.   Psychiatric: She has a normal mood and affect.   Vitals reviewed.      Assessment:       1. Acute pain of left shoulder        Plan:   Acute pain of left shoulder  -     Ambulatory Referral to Orthopedics  -     X-Ray Shoulder 2 or More Views " Left; Future; Expected date: 10/15/2018  -     methocarbamol (ROBAXIN) 500 MG Tab; Take 1 tablet (500 mg total) by mouth 4 (four) times daily. for 10 days  Dispense: 40 tablet; Refill: 0      Cautioned against chronic NSAID use.   appt today with ortho.    Follow-up if symptoms worsen or fail to improve, for today with ortho.

## 2019-02-08 DIAGNOSIS — Z12.39 BREAST CANCER SCREENING: ICD-10-CM

## 2019-10-22 DIAGNOSIS — Z12.11 COLON CANCER SCREENING: ICD-10-CM

## 2019-10-23 ENCOUNTER — PATIENT OUTREACH (OUTPATIENT)
Dept: ADMINISTRATIVE | Facility: HOSPITAL | Age: 54
End: 2019-10-23

## 2019-11-20 ENCOUNTER — PATIENT OUTREACH (OUTPATIENT)
Dept: ADMINISTRATIVE | Facility: HOSPITAL | Age: 54
End: 2019-11-20

## 2019-11-20 NOTE — PROGRESS NOTES
LM advising overdue for annual exam, follow up hypertension and routine testing such as labs and mammo. Encouraged to call or go online to schedule appt.

## 2019-11-22 ENCOUNTER — PATIENT OUTREACH (OUTPATIENT)
Dept: ADMINISTRATIVE | Facility: HOSPITAL | Age: 54
End: 2019-11-22

## 2019-12-12 ENCOUNTER — PATIENT OUTREACH (OUTPATIENT)
Dept: ADMINISTRATIVE | Facility: HOSPITAL | Age: 54
End: 2019-12-12

## 2019-12-18 ENCOUNTER — OFFICE VISIT (OUTPATIENT)
Dept: INTERNAL MEDICINE | Facility: CLINIC | Age: 54
End: 2019-12-18
Payer: COMMERCIAL

## 2019-12-18 VITALS
HEART RATE: 73 BPM | BODY MASS INDEX: 41.52 KG/M2 | OXYGEN SATURATION: 97 % | WEIGHT: 264.56 LBS | TEMPERATURE: 96 F | SYSTOLIC BLOOD PRESSURE: 100 MMHG | HEIGHT: 67 IN | DIASTOLIC BLOOD PRESSURE: 60 MMHG

## 2019-12-18 DIAGNOSIS — G43.009 MIGRAINE WITHOUT AURA AND WITHOUT STATUS MIGRAINOSUS, NOT INTRACTABLE: ICD-10-CM

## 2019-12-18 DIAGNOSIS — Z11.59 NEED FOR HEPATITIS C SCREENING TEST: ICD-10-CM

## 2019-12-18 DIAGNOSIS — Z00.00 ROUTINE GENERAL MEDICAL EXAMINATION AT A HEALTH CARE FACILITY: Primary | ICD-10-CM

## 2019-12-18 DIAGNOSIS — I10 ESSENTIAL HYPERTENSION: ICD-10-CM

## 2019-12-18 DIAGNOSIS — E88.819 INSULIN RESISTANCE: ICD-10-CM

## 2019-12-18 DIAGNOSIS — I49.5 SSS (SICK SINUS SYNDROME): ICD-10-CM

## 2019-12-18 DIAGNOSIS — E66.01 MORBID OBESITY WITH BMI OF 40.0-44.9, ADULT: ICD-10-CM

## 2019-12-18 PROCEDURE — 99396 PREV VISIT EST AGE 40-64: CPT | Mod: S$GLB,,, | Performed by: FAMILY MEDICINE

## 2019-12-18 PROCEDURE — 3074F PR MOST RECENT SYSTOLIC BLOOD PRESSURE < 130 MM HG: ICD-10-PCS | Mod: CPTII,S$GLB,, | Performed by: FAMILY MEDICINE

## 2019-12-18 PROCEDURE — 99999 PR PBB SHADOW E&M-EST. PATIENT-LVL III: CPT | Mod: PBBFAC,,, | Performed by: FAMILY MEDICINE

## 2019-12-18 PROCEDURE — 3074F SYST BP LT 130 MM HG: CPT | Mod: CPTII,S$GLB,, | Performed by: FAMILY MEDICINE

## 2019-12-18 PROCEDURE — 99999 PR PBB SHADOW E&M-EST. PATIENT-LVL III: ICD-10-PCS | Mod: PBBFAC,,, | Performed by: FAMILY MEDICINE

## 2019-12-18 PROCEDURE — 3078F PR MOST RECENT DIASTOLIC BLOOD PRESSURE < 80 MM HG: ICD-10-PCS | Mod: CPTII,S$GLB,, | Performed by: FAMILY MEDICINE

## 2019-12-18 PROCEDURE — 99396 PR PREVENTIVE VISIT,EST,40-64: ICD-10-PCS | Mod: S$GLB,,, | Performed by: FAMILY MEDICINE

## 2019-12-18 PROCEDURE — 3078F DIAST BP <80 MM HG: CPT | Mod: CPTII,S$GLB,, | Performed by: FAMILY MEDICINE

## 2019-12-18 RX ORDER — ATENOLOL AND CHLORTHALIDONE TABLET 50; 25 MG/1; MG/1
1 TABLET ORAL DAILY
Refills: 1 | COMMUNITY
Start: 2019-11-23

## 2019-12-18 NOTE — PROGRESS NOTES
Subjective:       Patient ID: Mini Aleman is a 54 y.o. female.    Chief Complaint: Annual Exam    Patient presents to clinic today for annual physical exam.    Review of Systems   Constitutional: Positive for unexpected weight change. Negative for activity change.   HENT: Negative for hearing loss, rhinorrhea and trouble swallowing.    Eyes: Positive for visual disturbance. Negative for discharge.   Respiratory: Negative for chest tightness and wheezing.    Cardiovascular: Positive for palpitations. Negative for chest pain.   Gastrointestinal: Negative for blood in stool, constipation, diarrhea and vomiting.   Endocrine: Negative for polyuria.   Genitourinary: Negative for difficulty urinating, dysuria, hematuria and menstrual problem.   Musculoskeletal: Positive for arthralgias and joint swelling. Negative for neck pain.   Neurological: Positive for weakness and headaches.   Psychiatric/Behavioral: Negative for confusion and dysphoric mood.     Above positive ROS are chronic/stable per patient report.  Objective:      Physical Exam   Constitutional: She is oriented to person, place, and time. Vital signs are normal. She appears well-developed and well-nourished. No distress.   HENT:   Head: Normocephalic and atraumatic.   Right Ear: Tympanic membrane, external ear and ear canal normal.   Left Ear: Tympanic membrane, external ear and ear canal normal.   Nose: Nose normal. No mucosal edema or rhinorrhea.   Mouth/Throat: Uvula is midline, oropharynx is clear and moist and mucous membranes are normal.   Eyes: Pupils are equal, round, and reactive to light. Conjunctivae, EOM and lids are normal.   Neck: Normal range of motion. Neck supple. No thyromegaly present.   Cardiovascular: Normal rate and regular rhythm. Exam reveals no gallop and no friction rub.   No murmur heard.  Pulmonary/Chest: Effort normal and breath sounds normal. She has no wheezes. She has no rhonchi. She has no rales.   Abdominal: Soft. Normal  appearance and bowel sounds are normal. She exhibits no distension and no mass. There is no hepatosplenomegaly. There is no tenderness.   Musculoskeletal: Normal range of motion.   Lymphadenopathy:     She has no cervical adenopathy.   Neurological: She is alert and oriented to person, place, and time. She has normal strength. No cranial nerve deficit or sensory deficit. Gait normal.   Reflex Scores:       Patellar reflexes are 2+ on the right side and 2+ on the left side.  Skin: Skin is warm and dry. No lesion and no rash noted. No cyanosis. Nails show no clubbing.   Psychiatric: She has a normal mood and affect.   Vitals reviewed.      Assessment:       1. Routine general medical examination at a health care facility    2. Need for hepatitis C screening test    3. Essential hypertension    4. Migraine without aura and without status migrainosus, not intractable    5. SSS (sick sinus syndrome)    6. Insulin resistance    7. Morbid obesity with BMI of 40.0-44.9, adult        Plan:     Problem List Items Addressed This Visit     Essential hypertension    Current Assessment & Plan     Controlled, continue atenolol-chlorthalidone         Insulin resistance    Current Assessment & Plan     Status pending labs         Migraine without aura and without status migrainosus, not intractable    Overview     Followed by Dr. Biggs, Neurology         Morbid obesity with BMI of 40.0-44.9, adult    SSS (sick sinus syndrome)    Overview     Followed by Dr. Oro, Cardiology           Other Visit Diagnoses     Routine general medical examination at a health care facility    -  Primary    Need for hepatitis C screening test        Relevant Orders    Hepatitis C antibody          Health Maintenance reviewed/updated. Discussed shingrix. Patient reports she has mailed fitkit.   Female health screenings per OB/GYN, Release signed for records.

## 2019-12-23 ENCOUNTER — PATIENT OUTREACH (OUTPATIENT)
Dept: ADMINISTRATIVE | Facility: HOSPITAL | Age: 54
End: 2019-12-23

## 2019-12-24 ENCOUNTER — LAB VISIT (OUTPATIENT)
Dept: LAB | Facility: HOSPITAL | Age: 54
End: 2019-12-24
Payer: COMMERCIAL

## 2019-12-24 DIAGNOSIS — E88.819 INSULIN RESISTANCE: ICD-10-CM

## 2019-12-24 DIAGNOSIS — E78.00 PURE HYPERCHOLESTEROLEMIA: ICD-10-CM

## 2019-12-24 DIAGNOSIS — Z11.59 NEED FOR HEPATITIS C SCREENING TEST: ICD-10-CM

## 2019-12-24 DIAGNOSIS — Z13.29 THYROID DISORDER SCREEN: ICD-10-CM

## 2019-12-24 DIAGNOSIS — Z00.00 ROUTINE GENERAL MEDICAL EXAMINATION AT A HEALTH CARE FACILITY: ICD-10-CM

## 2019-12-24 LAB
ALBUMIN SERPL BCP-MCNC: 3.5 G/DL (ref 3.5–5.2)
ALP SERPL-CCNC: 54 U/L (ref 55–135)
ALT SERPL W/O P-5'-P-CCNC: 15 U/L (ref 10–44)
ANION GAP SERPL CALC-SCNC: 10 MMOL/L (ref 8–16)
AST SERPL-CCNC: 15 U/L (ref 10–40)
BASOPHILS # BLD AUTO: 0.03 K/UL (ref 0–0.2)
BASOPHILS NFR BLD: 0.4 % (ref 0–1.9)
BILIRUB SERPL-MCNC: 0.3 MG/DL (ref 0.1–1)
BUN SERPL-MCNC: 17 MG/DL (ref 6–20)
CALCIUM SERPL-MCNC: 9.5 MG/DL (ref 8.7–10.5)
CHLORIDE SERPL-SCNC: 101 MMOL/L (ref 95–110)
CHOLEST SERPL-MCNC: 225 MG/DL (ref 120–199)
CHOLEST/HDLC SERPL: 3.4 {RATIO} (ref 2–5)
CO2 SERPL-SCNC: 29 MMOL/L (ref 23–29)
CREAT SERPL-MCNC: 0.9 MG/DL (ref 0.5–1.4)
DIFFERENTIAL METHOD: ABNORMAL
EOSINOPHIL # BLD AUTO: 0.1 K/UL (ref 0–0.5)
EOSINOPHIL NFR BLD: 1 % (ref 0–8)
ERYTHROCYTE [DISTWIDTH] IN BLOOD BY AUTOMATED COUNT: 13.4 % (ref 11.5–14.5)
EST. GFR  (AFRICAN AMERICAN): >60 ML/MIN/1.73 M^2
EST. GFR  (NON AFRICAN AMERICAN): >60 ML/MIN/1.73 M^2
ESTIMATED AVG GLUCOSE: 146 MG/DL (ref 68–131)
GLUCOSE SERPL-MCNC: 131 MG/DL (ref 70–110)
HBA1C MFR BLD HPLC: 6.7 % (ref 4–5.6)
HCT VFR BLD AUTO: 37.9 % (ref 37–48.5)
HDLC SERPL-MCNC: 67 MG/DL (ref 40–75)
HDLC SERPL: 29.8 % (ref 20–50)
HGB BLD-MCNC: 12.1 G/DL (ref 12–16)
IMM GRANULOCYTES # BLD AUTO: 0.03 K/UL (ref 0–0.04)
IMM GRANULOCYTES NFR BLD AUTO: 0.4 % (ref 0–0.5)
LDLC SERPL CALC-MCNC: 129.2 MG/DL (ref 63–159)
LYMPHOCYTES # BLD AUTO: 2 K/UL (ref 1–4.8)
LYMPHOCYTES NFR BLD: 27.7 % (ref 18–48)
MCH RBC QN AUTO: 28.9 PG (ref 27–31)
MCHC RBC AUTO-ENTMCNC: 31.9 G/DL (ref 32–36)
MCV RBC AUTO: 91 FL (ref 82–98)
MONOCYTES # BLD AUTO: 0.4 K/UL (ref 0.3–1)
MONOCYTES NFR BLD: 5.6 % (ref 4–15)
NEUTROPHILS # BLD AUTO: 4.6 K/UL (ref 1.8–7.7)
NEUTROPHILS NFR BLD: 64.9 % (ref 38–73)
NONHDLC SERPL-MCNC: 158 MG/DL
NRBC BLD-RTO: 0 /100 WBC
PLATELET # BLD AUTO: 310 K/UL (ref 150–350)
PMV BLD AUTO: 10.9 FL (ref 9.2–12.9)
POTASSIUM SERPL-SCNC: 3.5 MMOL/L (ref 3.5–5.1)
PROT SERPL-MCNC: 6.9 G/DL (ref 6–8.4)
RBC # BLD AUTO: 4.18 M/UL (ref 4–5.4)
SODIUM SERPL-SCNC: 140 MMOL/L (ref 136–145)
TRIGL SERPL-MCNC: 144 MG/DL (ref 30–150)
TSH SERPL DL<=0.005 MIU/L-ACNC: 1.12 UIU/ML (ref 0.4–4)
WBC # BLD AUTO: 7.12 K/UL (ref 3.9–12.7)

## 2019-12-24 PROCEDURE — 36415 COLL VENOUS BLD VENIPUNCTURE: CPT

## 2019-12-24 PROCEDURE — 83036 HEMOGLOBIN GLYCOSYLATED A1C: CPT

## 2019-12-24 PROCEDURE — 80053 COMPREHEN METABOLIC PANEL: CPT

## 2019-12-24 PROCEDURE — 85025 COMPLETE CBC W/AUTO DIFF WBC: CPT

## 2019-12-24 PROCEDURE — 80061 LIPID PANEL: CPT

## 2019-12-24 PROCEDURE — 84443 ASSAY THYROID STIM HORMONE: CPT

## 2019-12-24 PROCEDURE — 86803 HEPATITIS C AB TEST: CPT

## 2019-12-26 LAB — HCV AB SERPL QL IA: NEGATIVE

## 2020-01-11 ENCOUNTER — TELEPHONE (OUTPATIENT)
Dept: INTERNAL MEDICINE | Facility: CLINIC | Age: 55
End: 2020-01-11

## 2020-01-11 DIAGNOSIS — Z12.11 COLON CANCER SCREENING: Primary | ICD-10-CM

## 2020-01-11 NOTE — TELEPHONE ENCOUNTER
----- Message from Jennyfer Mcneal sent at 2019  3:42 AM CST -----  Patient FOBT specimen received in lab already

## 2020-02-05 ENCOUNTER — PATIENT OUTREACH (OUTPATIENT)
Dept: ADMINISTRATIVE | Facility: HOSPITAL | Age: 55
End: 2020-02-05

## 2020-02-21 ENCOUNTER — APPOINTMENT (OUTPATIENT)
Dept: LAB | Facility: HOSPITAL | Age: 55
End: 2020-02-21
Attending: FAMILY MEDICINE
Payer: COMMERCIAL

## 2020-03-11 ENCOUNTER — PATIENT OUTREACH (OUTPATIENT)
Dept: ADMINISTRATIVE | Facility: HOSPITAL | Age: 55
End: 2020-03-11

## 2020-04-16 ENCOUNTER — TELEPHONE (OUTPATIENT)
Dept: INTERNAL MEDICINE | Facility: CLINIC | Age: 55
End: 2020-04-16

## 2021-01-06 DIAGNOSIS — Z12.31 OTHER SCREENING MAMMOGRAM: ICD-10-CM

## 2021-01-28 ENCOUNTER — PATIENT OUTREACH (OUTPATIENT)
Dept: ADMINISTRATIVE | Facility: HOSPITAL | Age: 56
End: 2021-01-28

## 2021-02-17 ENCOUNTER — LAB VISIT (OUTPATIENT)
Dept: LAB | Facility: HOSPITAL | Age: 56
End: 2021-02-17
Attending: FAMILY MEDICINE
Payer: COMMERCIAL

## 2021-02-17 DIAGNOSIS — Z13.29 THYROID DISORDER SCREEN: ICD-10-CM

## 2021-02-17 DIAGNOSIS — E78.00 PURE HYPERCHOLESTEROLEMIA: ICD-10-CM

## 2021-02-17 DIAGNOSIS — Z00.00 ROUTINE GENERAL MEDICAL EXAMINATION AT A HEALTH CARE FACILITY: ICD-10-CM

## 2021-02-17 DIAGNOSIS — E88.819 INSULIN RESISTANCE: ICD-10-CM

## 2021-02-17 LAB
ALBUMIN SERPL BCP-MCNC: 3.5 G/DL (ref 3.5–5.2)
ALP SERPL-CCNC: 47 U/L (ref 55–135)
ALT SERPL W/O P-5'-P-CCNC: 14 U/L (ref 10–44)
ANION GAP SERPL CALC-SCNC: 11 MMOL/L (ref 8–16)
AST SERPL-CCNC: 14 U/L (ref 10–40)
BASOPHILS # BLD AUTO: 0.06 K/UL (ref 0–0.2)
BASOPHILS NFR BLD: 0.8 % (ref 0–1.9)
BILIRUB SERPL-MCNC: 0.3 MG/DL (ref 0.1–1)
BUN SERPL-MCNC: 16 MG/DL (ref 6–20)
CALCIUM SERPL-MCNC: 9.3 MG/DL (ref 8.7–10.5)
CHLORIDE SERPL-SCNC: 100 MMOL/L (ref 95–110)
CHOLEST SERPL-MCNC: 233 MG/DL (ref 120–199)
CHOLEST/HDLC SERPL: 3.7 {RATIO} (ref 2–5)
CO2 SERPL-SCNC: 28 MMOL/L (ref 23–29)
CREAT SERPL-MCNC: 0.8 MG/DL (ref 0.5–1.4)
DIFFERENTIAL METHOD: NORMAL
EOSINOPHIL # BLD AUTO: 0.1 K/UL (ref 0–0.5)
EOSINOPHIL NFR BLD: 1.6 % (ref 0–8)
ERYTHROCYTE [DISTWIDTH] IN BLOOD BY AUTOMATED COUNT: 13.1 % (ref 11.5–14.5)
EST. GFR  (AFRICAN AMERICAN): >60 ML/MIN/1.73 M^2
EST. GFR  (NON AFRICAN AMERICAN): >60 ML/MIN/1.73 M^2
ESTIMATED AVG GLUCOSE: 148 MG/DL (ref 68–131)
GLUCOSE SERPL-MCNC: 140 MG/DL (ref 70–110)
HBA1C MFR BLD: 6.8 % (ref 4–5.6)
HCT VFR BLD AUTO: 38.8 % (ref 37–48.5)
HDLC SERPL-MCNC: 63 MG/DL (ref 40–75)
HDLC SERPL: 27 % (ref 20–50)
HGB BLD-MCNC: 12.7 G/DL (ref 12–16)
IMM GRANULOCYTES # BLD AUTO: 0.02 K/UL (ref 0–0.04)
IMM GRANULOCYTES NFR BLD AUTO: 0.3 % (ref 0–0.5)
LDLC SERPL CALC-MCNC: 131.6 MG/DL (ref 63–159)
LYMPHOCYTES # BLD AUTO: 2.2 K/UL (ref 1–4.8)
LYMPHOCYTES NFR BLD: 27.4 % (ref 18–48)
MCH RBC QN AUTO: 29.4 PG (ref 27–31)
MCHC RBC AUTO-ENTMCNC: 32.7 G/DL (ref 32–36)
MCV RBC AUTO: 90 FL (ref 82–98)
MONOCYTES # BLD AUTO: 0.5 K/UL (ref 0.3–1)
MONOCYTES NFR BLD: 6.6 % (ref 4–15)
NEUTROPHILS # BLD AUTO: 5 K/UL (ref 1.8–7.7)
NEUTROPHILS NFR BLD: 63.3 % (ref 38–73)
NONHDLC SERPL-MCNC: 170 MG/DL
NRBC BLD-RTO: 0 /100 WBC
PLATELET # BLD AUTO: 287 K/UL (ref 150–350)
PMV BLD AUTO: 11 FL (ref 9.2–12.9)
POTASSIUM SERPL-SCNC: 4.2 MMOL/L (ref 3.5–5.1)
PROT SERPL-MCNC: 6.9 G/DL (ref 6–8.4)
RBC # BLD AUTO: 4.32 M/UL (ref 4–5.4)
SODIUM SERPL-SCNC: 139 MMOL/L (ref 136–145)
TRIGL SERPL-MCNC: 192 MG/DL (ref 30–150)
TSH SERPL DL<=0.005 MIU/L-ACNC: 2.43 UIU/ML (ref 0.4–4)
WBC # BLD AUTO: 7.88 K/UL (ref 3.9–12.7)

## 2021-02-17 PROCEDURE — 83036 HEMOGLOBIN GLYCOSYLATED A1C: CPT

## 2021-02-17 PROCEDURE — 80061 LIPID PANEL: CPT

## 2021-02-17 PROCEDURE — 80053 COMPREHEN METABOLIC PANEL: CPT

## 2021-02-17 PROCEDURE — 36415 COLL VENOUS BLD VENIPUNCTURE: CPT | Mod: PO

## 2021-02-17 PROCEDURE — 85025 COMPLETE CBC W/AUTO DIFF WBC: CPT

## 2021-02-17 PROCEDURE — 84443 ASSAY THYROID STIM HORMONE: CPT

## 2021-03-03 ENCOUNTER — OFFICE VISIT (OUTPATIENT)
Dept: INTERNAL MEDICINE | Facility: CLINIC | Age: 56
End: 2021-03-03
Payer: COMMERCIAL

## 2021-03-03 ENCOUNTER — LAB VISIT (OUTPATIENT)
Dept: LAB | Facility: HOSPITAL | Age: 56
End: 2021-03-03
Payer: COMMERCIAL

## 2021-03-03 VITALS
DIASTOLIC BLOOD PRESSURE: 70 MMHG | OXYGEN SATURATION: 95 % | BODY MASS INDEX: 39.71 KG/M2 | WEIGHT: 253 LBS | SYSTOLIC BLOOD PRESSURE: 120 MMHG | TEMPERATURE: 98 F | HEIGHT: 67 IN | HEART RATE: 83 BPM

## 2021-03-03 DIAGNOSIS — E11.59 HYPERTENSION ASSOCIATED WITH DIABETES: ICD-10-CM

## 2021-03-03 DIAGNOSIS — E11.9 DIET-CONTROLLED TYPE 2 DIABETES MELLITUS: ICD-10-CM

## 2021-03-03 DIAGNOSIS — Z12.11 COLON CANCER SCREENING: ICD-10-CM

## 2021-03-03 DIAGNOSIS — E11.69 HYPERLIPIDEMIA ASSOCIATED WITH TYPE 2 DIABETES MELLITUS: ICD-10-CM

## 2021-03-03 DIAGNOSIS — Z00.00 ROUTINE GENERAL MEDICAL EXAMINATION AT A HEALTH CARE FACILITY: Primary | ICD-10-CM

## 2021-03-03 DIAGNOSIS — E66.01 SEVERE OBESITY (BMI 35.0-39.9) WITH COMORBIDITY: ICD-10-CM

## 2021-03-03 DIAGNOSIS — I15.2 HYPERTENSION ASSOCIATED WITH DIABETES: ICD-10-CM

## 2021-03-03 DIAGNOSIS — I49.5 SSS (SICK SINUS SYNDROME): ICD-10-CM

## 2021-03-03 DIAGNOSIS — E78.5 HYPERLIPIDEMIA ASSOCIATED WITH TYPE 2 DIABETES MELLITUS: ICD-10-CM

## 2021-03-03 DIAGNOSIS — G43.009 MIGRAINE WITHOUT AURA AND WITHOUT STATUS MIGRAINOSUS, NOT INTRACTABLE: ICD-10-CM

## 2021-03-03 PROCEDURE — 82570 ASSAY OF URINE CREATININE: CPT | Performed by: FAMILY MEDICINE

## 2021-03-03 PROCEDURE — 1126F AMNT PAIN NOTED NONE PRSNT: CPT | Mod: S$GLB,,, | Performed by: FAMILY MEDICINE

## 2021-03-03 PROCEDURE — 3044F HG A1C LEVEL LT 7.0%: CPT | Mod: CPTII,S$GLB,, | Performed by: FAMILY MEDICINE

## 2021-03-03 PROCEDURE — 99396 PREV VISIT EST AGE 40-64: CPT | Mod: S$GLB,,, | Performed by: FAMILY MEDICINE

## 2021-03-03 PROCEDURE — 99999 PR PBB SHADOW E&M-EST. PATIENT-LVL IV: ICD-10-PCS | Mod: PBBFAC,,, | Performed by: FAMILY MEDICINE

## 2021-03-03 PROCEDURE — 3074F SYST BP LT 130 MM HG: CPT | Mod: CPTII,S$GLB,, | Performed by: FAMILY MEDICINE

## 2021-03-03 PROCEDURE — 1126F PR PAIN SEVERITY QUANTIFIED, NO PAIN PRESENT: ICD-10-PCS | Mod: S$GLB,,, | Performed by: FAMILY MEDICINE

## 2021-03-03 PROCEDURE — 3008F PR BODY MASS INDEX (BMI) DOCUMENTED: ICD-10-PCS | Mod: CPTII,S$GLB,, | Performed by: FAMILY MEDICINE

## 2021-03-03 PROCEDURE — 99396 PR PREVENTIVE VISIT,EST,40-64: ICD-10-PCS | Mod: S$GLB,,, | Performed by: FAMILY MEDICINE

## 2021-03-03 PROCEDURE — 99999 PR PBB SHADOW E&M-EST. PATIENT-LVL IV: CPT | Mod: PBBFAC,,, | Performed by: FAMILY MEDICINE

## 2021-03-03 PROCEDURE — 82043 UR ALBUMIN QUANTITATIVE: CPT | Performed by: FAMILY MEDICINE

## 2021-03-03 PROCEDURE — 3044F PR MOST RECENT HEMOGLOBIN A1C LEVEL <7.0%: ICD-10-PCS | Mod: CPTII,S$GLB,, | Performed by: FAMILY MEDICINE

## 2021-03-03 PROCEDURE — 3074F PR MOST RECENT SYSTOLIC BLOOD PRESSURE < 130 MM HG: ICD-10-PCS | Mod: CPTII,S$GLB,, | Performed by: FAMILY MEDICINE

## 2021-03-03 PROCEDURE — 3008F BODY MASS INDEX DOCD: CPT | Mod: CPTII,S$GLB,, | Performed by: FAMILY MEDICINE

## 2021-03-03 PROCEDURE — 3078F PR MOST RECENT DIASTOLIC BLOOD PRESSURE < 80 MM HG: ICD-10-PCS | Mod: CPTII,S$GLB,, | Performed by: FAMILY MEDICINE

## 2021-03-03 PROCEDURE — 3078F DIAST BP <80 MM HG: CPT | Mod: CPTII,S$GLB,, | Performed by: FAMILY MEDICINE

## 2021-03-03 RX ORDER — METFORMIN HYDROCHLORIDE 500 MG/1
500 TABLET ORAL
Qty: 90 TABLET | Refills: 3 | Status: SHIPPED | OUTPATIENT
Start: 2021-03-03 | End: 2021-11-30

## 2021-03-03 RX ORDER — PRAVASTATIN SODIUM 10 MG/1
10 TABLET ORAL DAILY
Qty: 90 TABLET | Refills: 3 | Status: SHIPPED | OUTPATIENT
Start: 2021-03-03 | End: 2021-11-30

## 2021-03-04 LAB
ALBUMIN/CREAT UR: 12.7 UG/MG (ref 0–30)
CREAT UR-MCNC: 55 MG/DL (ref 15–325)
MICROALBUMIN UR DL<=1MG/L-MCNC: 7 UG/ML

## 2021-03-23 ENCOUNTER — PATIENT OUTREACH (OUTPATIENT)
Dept: ADMINISTRATIVE | Facility: HOSPITAL | Age: 56
End: 2021-03-23

## 2021-04-05 ENCOUNTER — PATIENT MESSAGE (OUTPATIENT)
Dept: ADMINISTRATIVE | Facility: HOSPITAL | Age: 56
End: 2021-04-05

## 2021-04-07 ENCOUNTER — APPOINTMENT (OUTPATIENT)
Dept: LAB | Facility: HOSPITAL | Age: 56
End: 2021-04-07
Attending: FAMILY MEDICINE
Payer: COMMERCIAL

## 2021-06-03 ENCOUNTER — LAB VISIT (OUTPATIENT)
Dept: LAB | Facility: HOSPITAL | Age: 56
End: 2021-06-03
Attending: FAMILY MEDICINE
Payer: COMMERCIAL

## 2021-06-03 DIAGNOSIS — E11.9 DIET-CONTROLLED TYPE 2 DIABETES MELLITUS: ICD-10-CM

## 2021-06-03 DIAGNOSIS — E11.69 HYPERLIPIDEMIA ASSOCIATED WITH TYPE 2 DIABETES MELLITUS: ICD-10-CM

## 2021-06-03 DIAGNOSIS — E78.5 HYPERLIPIDEMIA ASSOCIATED WITH TYPE 2 DIABETES MELLITUS: ICD-10-CM

## 2021-06-03 PROCEDURE — 83036 HEMOGLOBIN GLYCOSYLATED A1C: CPT | Performed by: FAMILY MEDICINE

## 2021-06-03 PROCEDURE — 80053 COMPREHEN METABOLIC PANEL: CPT | Performed by: FAMILY MEDICINE

## 2021-06-03 PROCEDURE — 36415 COLL VENOUS BLD VENIPUNCTURE: CPT | Mod: PO | Performed by: FAMILY MEDICINE

## 2021-06-03 PROCEDURE — 80061 LIPID PANEL: CPT | Performed by: FAMILY MEDICINE

## 2021-06-04 LAB
ALBUMIN SERPL BCP-MCNC: 3.4 G/DL (ref 3.5–5.2)
ALP SERPL-CCNC: 48 U/L (ref 55–135)
ALT SERPL W/O P-5'-P-CCNC: 13 U/L (ref 10–44)
ANION GAP SERPL CALC-SCNC: 11 MMOL/L (ref 8–16)
AST SERPL-CCNC: 13 U/L (ref 10–40)
BILIRUB SERPL-MCNC: 0.2 MG/DL (ref 0.1–1)
BUN SERPL-MCNC: 18 MG/DL (ref 6–20)
CALCIUM SERPL-MCNC: 9.6 MG/DL (ref 8.7–10.5)
CHLORIDE SERPL-SCNC: 101 MMOL/L (ref 95–110)
CHOLEST SERPL-MCNC: 216 MG/DL (ref 120–199)
CHOLEST/HDLC SERPL: 3.4 {RATIO} (ref 2–5)
CO2 SERPL-SCNC: 28 MMOL/L (ref 23–29)
CREAT SERPL-MCNC: 0.8 MG/DL (ref 0.5–1.4)
EST. GFR  (AFRICAN AMERICAN): >60 ML/MIN/1.73 M^2
EST. GFR  (NON AFRICAN AMERICAN): >60 ML/MIN/1.73 M^2
ESTIMATED AVG GLUCOSE: 137 MG/DL (ref 68–131)
GLUCOSE SERPL-MCNC: 137 MG/DL (ref 70–110)
HBA1C MFR BLD: 6.4 % (ref 4–5.6)
HDLC SERPL-MCNC: 63 MG/DL (ref 40–75)
HDLC SERPL: 29.2 % (ref 20–50)
LDLC SERPL CALC-MCNC: 122 MG/DL (ref 63–159)
NONHDLC SERPL-MCNC: 153 MG/DL
POTASSIUM SERPL-SCNC: 3.9 MMOL/L (ref 3.5–5.1)
PROT SERPL-MCNC: 6.8 G/DL (ref 6–8.4)
SODIUM SERPL-SCNC: 140 MMOL/L (ref 136–145)
TRIGL SERPL-MCNC: 155 MG/DL (ref 30–150)

## 2021-07-06 ENCOUNTER — OFFICE VISIT (OUTPATIENT)
Dept: OPHTHALMOLOGY | Facility: CLINIC | Age: 56
End: 2021-07-06
Payer: COMMERCIAL

## 2021-07-06 DIAGNOSIS — E11.9 DIET-CONTROLLED TYPE 2 DIABETES MELLITUS: ICD-10-CM

## 2021-07-06 DIAGNOSIS — Z83.518 FAMILY HISTORY OF MACULAR DEGENERATION: ICD-10-CM

## 2021-07-06 DIAGNOSIS — H52.7 REFRACTIVE ERRORS: ICD-10-CM

## 2021-07-06 DIAGNOSIS — E11.9 DIABETES MELLITUS TYPE 2 WITHOUT RETINOPATHY: Primary | ICD-10-CM

## 2021-07-06 PROCEDURE — 99999 PR PBB SHADOW E&M-EST. PATIENT-LVL I: CPT | Mod: PBBFAC,,, | Performed by: OPTOMETRIST

## 2021-07-06 PROCEDURE — 92015 PR REFRACTION: ICD-10-PCS | Mod: S$GLB,,, | Performed by: OPTOMETRIST

## 2021-07-06 PROCEDURE — 2023F DILAT RTA XM W/O RTNOPTHY: CPT | Mod: S$GLB,,, | Performed by: OPTOMETRIST

## 2021-07-06 PROCEDURE — 2023F PR DILATED RETINAL EXAM W/O EVID OF RETINOPATHY: ICD-10-PCS | Mod: S$GLB,,, | Performed by: OPTOMETRIST

## 2021-07-06 PROCEDURE — 3044F PR MOST RECENT HEMOGLOBIN A1C LEVEL <7.0%: ICD-10-PCS | Mod: CPTII,S$GLB,, | Performed by: OPTOMETRIST

## 2021-07-06 PROCEDURE — 92004 PR EYE EXAM, NEW PATIENT,COMPREHESV: ICD-10-PCS | Mod: S$GLB,,, | Performed by: OPTOMETRIST

## 2021-07-06 PROCEDURE — 99999 PR PBB SHADOW E&M-EST. PATIENT-LVL I: ICD-10-PCS | Mod: PBBFAC,,, | Performed by: OPTOMETRIST

## 2021-07-06 PROCEDURE — 3044F HG A1C LEVEL LT 7.0%: CPT | Mod: CPTII,S$GLB,, | Performed by: OPTOMETRIST

## 2021-07-06 PROCEDURE — 92015 DETERMINE REFRACTIVE STATE: CPT | Mod: S$GLB,,, | Performed by: OPTOMETRIST

## 2021-07-06 PROCEDURE — 92004 COMPRE OPH EXAM NEW PT 1/>: CPT | Mod: S$GLB,,, | Performed by: OPTOMETRIST

## 2021-07-06 RX ORDER — UBIDECARENONE 30 MG
30 CAPSULE ORAL 3 TIMES DAILY
COMMUNITY

## 2021-09-22 ENCOUNTER — PATIENT OUTREACH (OUTPATIENT)
Dept: ADMINISTRATIVE | Facility: HOSPITAL | Age: 56
End: 2021-09-22

## 2021-11-28 DIAGNOSIS — E11.9 DIET-CONTROLLED TYPE 2 DIABETES MELLITUS: ICD-10-CM

## 2021-11-30 DIAGNOSIS — E11.69 HYPERLIPIDEMIA ASSOCIATED WITH TYPE 2 DIABETES MELLITUS: ICD-10-CM

## 2021-11-30 DIAGNOSIS — E78.5 HYPERLIPIDEMIA ASSOCIATED WITH TYPE 2 DIABETES MELLITUS: ICD-10-CM

## 2021-11-30 RX ORDER — METFORMIN HYDROCHLORIDE 500 MG/1
TABLET ORAL
Qty: 90 TABLET | Refills: 0 | Status: SHIPPED | OUTPATIENT
Start: 2021-11-30 | End: 2022-04-16 | Stop reason: SDUPTHER

## 2021-11-30 RX ORDER — PRAVASTATIN SODIUM 10 MG/1
TABLET ORAL
Qty: 90 TABLET | Refills: 0 | Status: SHIPPED | OUTPATIENT
Start: 2021-11-30 | End: 2022-04-16 | Stop reason: SDUPTHER

## 2022-03-03 ENCOUNTER — PATIENT MESSAGE (OUTPATIENT)
Dept: ADMINISTRATIVE | Facility: HOSPITAL | Age: 57
End: 2022-03-03
Payer: COMMERCIAL

## 2022-03-08 ENCOUNTER — TELEPHONE (OUTPATIENT)
Dept: INTERNAL MEDICINE | Facility: CLINIC | Age: 57
End: 2022-03-08
Payer: COMMERCIAL

## 2022-03-08 ENCOUNTER — OFFICE VISIT (OUTPATIENT)
Dept: URGENT CARE | Facility: CLINIC | Age: 57
End: 2022-03-08
Payer: COMMERCIAL

## 2022-03-08 VITALS
OXYGEN SATURATION: 97 % | HEART RATE: 77 BPM | BODY MASS INDEX: 40.02 KG/M2 | WEIGHT: 255 LBS | TEMPERATURE: 98 F | RESPIRATION RATE: 18 BRPM | SYSTOLIC BLOOD PRESSURE: 137 MMHG | HEIGHT: 67 IN | DIASTOLIC BLOOD PRESSURE: 62 MMHG

## 2022-03-08 DIAGNOSIS — J06.9 VIRAL URI: Primary | ICD-10-CM

## 2022-03-08 DIAGNOSIS — R05.9 COUGH: ICD-10-CM

## 2022-03-08 LAB
CTP QC/QA: YES
CTP QC/QA: YES
POC MOLECULAR INFLUENZA A AGN: NEGATIVE
POC MOLECULAR INFLUENZA B AGN: NEGATIVE
SARS-COV-2 RDRP RESP QL NAA+PROBE: NEGATIVE

## 2022-03-08 PROCEDURE — 3075F SYST BP GE 130 - 139MM HG: CPT | Mod: CPTII,S$GLB,, | Performed by: PHYSICIAN ASSISTANT

## 2022-03-08 PROCEDURE — 99213 PR OFFICE/OUTPT VISIT, EST, LEVL III, 20-29 MIN: ICD-10-PCS | Mod: S$GLB,,, | Performed by: PHYSICIAN ASSISTANT

## 2022-03-08 PROCEDURE — U0002: ICD-10-PCS | Mod: QW,S$GLB,, | Performed by: PHYSICIAN ASSISTANT

## 2022-03-08 PROCEDURE — U0002 COVID-19 LAB TEST NON-CDC: HCPCS | Mod: QW,S$GLB,, | Performed by: PHYSICIAN ASSISTANT

## 2022-03-08 PROCEDURE — 99213 OFFICE O/P EST LOW 20 MIN: CPT | Mod: S$GLB,,, | Performed by: PHYSICIAN ASSISTANT

## 2022-03-08 PROCEDURE — 3008F PR BODY MASS INDEX (BMI) DOCUMENTED: ICD-10-PCS | Mod: CPTII,S$GLB,, | Performed by: PHYSICIAN ASSISTANT

## 2022-03-08 PROCEDURE — 3075F PR MOST RECENT SYSTOLIC BLOOD PRESS GE 130-139MM HG: ICD-10-PCS | Mod: CPTII,S$GLB,, | Performed by: PHYSICIAN ASSISTANT

## 2022-03-08 PROCEDURE — 1159F PR MEDICATION LIST DOCUMENTED IN MEDICAL RECORD: ICD-10-PCS | Mod: CPTII,S$GLB,, | Performed by: PHYSICIAN ASSISTANT

## 2022-03-08 PROCEDURE — 87502 POCT INFLUENZA A/B MOLECULAR: ICD-10-PCS | Mod: QW,S$GLB,, | Performed by: PHYSICIAN ASSISTANT

## 2022-03-08 PROCEDURE — 1159F MED LIST DOCD IN RCRD: CPT | Mod: CPTII,S$GLB,, | Performed by: PHYSICIAN ASSISTANT

## 2022-03-08 PROCEDURE — 3078F DIAST BP <80 MM HG: CPT | Mod: CPTII,S$GLB,, | Performed by: PHYSICIAN ASSISTANT

## 2022-03-08 PROCEDURE — 1160F RVW MEDS BY RX/DR IN RCRD: CPT | Mod: CPTII,S$GLB,, | Performed by: PHYSICIAN ASSISTANT

## 2022-03-08 PROCEDURE — 3072F PR LOW RISK FOR RETINOPATHY: ICD-10-PCS | Mod: CPTII,S$GLB,, | Performed by: PHYSICIAN ASSISTANT

## 2022-03-08 PROCEDURE — 87502 INFLUENZA DNA AMP PROBE: CPT | Mod: QW,S$GLB,, | Performed by: PHYSICIAN ASSISTANT

## 2022-03-08 PROCEDURE — 3078F PR MOST RECENT DIASTOLIC BLOOD PRESSURE < 80 MM HG: ICD-10-PCS | Mod: CPTII,S$GLB,, | Performed by: PHYSICIAN ASSISTANT

## 2022-03-08 PROCEDURE — 3072F LOW RISK FOR RETINOPATHY: CPT | Mod: CPTII,S$GLB,, | Performed by: PHYSICIAN ASSISTANT

## 2022-03-08 PROCEDURE — 1160F PR REVIEW ALL MEDS BY PRESCRIBER/CLIN PHARMACIST DOCUMENTED: ICD-10-PCS | Mod: CPTII,S$GLB,, | Performed by: PHYSICIAN ASSISTANT

## 2022-03-08 PROCEDURE — 3008F BODY MASS INDEX DOCD: CPT | Mod: CPTII,S$GLB,, | Performed by: PHYSICIAN ASSISTANT

## 2022-03-08 NOTE — TELEPHONE ENCOUNTER
----- Message from Arlyn Sheffield sent at 3/8/2022  9:18 AM CST -----  Regarding: appt  Contact: pt  Type:  Same Day Appointment Request    Caller is requesting a same day appointment.  Caller declined first available appointment listed below.    Name of Caller: pt  When is the first available appointment? 03/09  Symptoms: sore throat,body aches   Best Call Back Number:[phone]  Additional Information: n/a

## 2022-03-08 NOTE — TELEPHONE ENCOUNTER
Spoke to pt about scheduling appt.  All appts nurse could see were for 03/09.  Consulted with pt and pt is proceeding to [facility] near home to be assessed.

## 2022-03-08 NOTE — PROGRESS NOTES
"Subjective:       Patient ID: Mini Aleman is a 56 y.o. female.    Vitals:  height is 5' 7" (1.702 m) and weight is 115.7 kg (255 lb). Her tympanic temperature is 97.9 °F (36.6 °C). Her blood pressure is 137/62 and her pulse is 77. Her respiration is 18 and oxygen saturation is 97%.     Chief Complaint: Sore Throat    Patient presents to Urgent Care this morning for experiencing symptoms of sore throat, chills, body aches and dry, non productive cough. These systems began approx 4am this morning. Patient does admit to having a dull headache yesterday but has normal migraines.  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 Patient did not try any OTC medications prior to coming into the clinic.     Sore Throat   This is a new problem. The current episode started today. The problem has been rapidly worsening. The pain is worse on the left side. There has been no fever. The pain is at a severity of 8/10. The pain is mild. Associated symptoms include coughing, ear pain and headaches. Pertinent negatives include no abdominal pain, congestion, diarrhea, drooling, hoarse voice, plugged ear sensation, stridor, swollen glands, trouble swallowing or vomiting. She has had no exposure to strep or mono. She has tried nothing for the symptoms. The treatment provided no relief.       Constitution: Positive for chills. Negative for fever.   HENT: Positive for ear pain and sore throat. Negative for drooling, congestion and trouble swallowing.    Respiratory: Positive for cough. Negative for stridor.    Gastrointestinal: Negative for abdominal pain, vomiting and diarrhea.   Musculoskeletal: Positive for muscle ache.   Neurological: Positive for headaches.       Objective:      Physical Exam   Constitutional: She is oriented to person, place, and time. She appears well-developed.   HENT:   Head: Normocephalic and atraumatic.   Ears:   Right Ear: Tympanic membrane, " external ear and ear canal normal.   Left Ear: Tympanic membrane, external ear and ear canal normal.   Mouth/Throat: Oropharynx is clear and moist.   Eyes: EOM are normal. Pupils are equal, round, and reactive to light.   Neck: Neck supple.   Cardiovascular: Normal rate and regular rhythm.   Pulmonary/Chest: Effort normal and breath sounds normal.   Abdominal: Bowel sounds are normal. Soft.   Musculoskeletal: Normal range of motion.         General: Normal range of motion.   Neurological: She is alert and oriented to person, place, and time.   Skin: Skin is warm and dry.   Vitals reviewed.        Assessment:       1. Viral URI    2. Cough          Plan:         Viral URI    Cough  -     POCT COVID-19 Rapid Screening  -     POCT Influenza A/B Molecular       Covid and Flu test are negative.      Increase fluids.  Get plenty of rest.   Normal saline nasal wash to irrigate sinuses and for congestion/runny nose.  Cool mist humidifier/vaporizer.  Practice good handwashing.  Mucinex for cough and chest congestion.  Tylenol or Ibuprofen for fever, headache and body aches.  Warm salt water gargles for throat comfort.  Chloraseptic spray or lozenges for throat comfort.  See PCP or go to ER if symptoms worsen or fail to improve with treatment.

## 2022-03-08 NOTE — LETTER
March 8, 2022      [facility]  [address] , [address]  [facility] [address]  Phone: [phone]  Fax: [phone]       Patient: Mini Aleman   YOB: 1965  Date of Visit: 03/08/2022    To Whom It May Concern:    Johan Aleman  was at [facility] on 03/08/2022. The patient may return to work on 3/10/2022 with no restrictions. If you have any questions or concerns, or if I can be of further assistance, please do not hesitate to contact me.    Sincerely,    Bennett Sheffield PA-C

## 2022-03-11 ENCOUNTER — TELEPHONE (OUTPATIENT)
Dept: URGENT CARE | Facility: CLINIC | Age: 57
End: 2022-03-11
Payer: COMMERCIAL

## 2022-03-11 ENCOUNTER — OFFICE VISIT (OUTPATIENT)
Dept: URGENT CARE | Facility: CLINIC | Age: 57
End: 2022-03-11
Payer: COMMERCIAL

## 2022-03-11 VITALS
HEIGHT: 67 IN | OXYGEN SATURATION: 96 % | BODY MASS INDEX: 40.02 KG/M2 | SYSTOLIC BLOOD PRESSURE: 152 MMHG | DIASTOLIC BLOOD PRESSURE: 78 MMHG | WEIGHT: 255 LBS | HEART RATE: 68 BPM | RESPIRATION RATE: 18 BRPM | TEMPERATURE: 98 F

## 2022-03-11 DIAGNOSIS — J02.9 SORE THROAT: ICD-10-CM

## 2022-03-11 DIAGNOSIS — R59.9 GLANDS SWOLLEN: ICD-10-CM

## 2022-03-11 DIAGNOSIS — J02.0 STREP PHARYNGITIS: Primary | ICD-10-CM

## 2022-03-11 LAB
CTP QC/QA: YES
MOLECULAR STREP A: POSITIVE

## 2022-03-11 PROCEDURE — 3072F PR LOW RISK FOR RETINOPATHY: ICD-10-PCS | Mod: CPTII,S$GLB,, | Performed by: EMERGENCY MEDICINE

## 2022-03-11 PROCEDURE — 99212 PR OFFICE/OUTPT VISIT, EST, LEVL II, 10-19 MIN: ICD-10-PCS | Mod: S$GLB,,, | Performed by: EMERGENCY MEDICINE

## 2022-03-11 PROCEDURE — 3078F PR MOST RECENT DIASTOLIC BLOOD PRESSURE < 80 MM HG: ICD-10-PCS | Mod: CPTII,S$GLB,, | Performed by: EMERGENCY MEDICINE

## 2022-03-11 PROCEDURE — 3077F SYST BP >= 140 MM HG: CPT | Mod: CPTII,S$GLB,, | Performed by: EMERGENCY MEDICINE

## 2022-03-11 PROCEDURE — 3008F PR BODY MASS INDEX (BMI) DOCUMENTED: ICD-10-PCS | Mod: CPTII,S$GLB,, | Performed by: EMERGENCY MEDICINE

## 2022-03-11 PROCEDURE — 3008F BODY MASS INDEX DOCD: CPT | Mod: CPTII,S$GLB,, | Performed by: EMERGENCY MEDICINE

## 2022-03-11 PROCEDURE — 3072F LOW RISK FOR RETINOPATHY: CPT | Mod: CPTII,S$GLB,, | Performed by: EMERGENCY MEDICINE

## 2022-03-11 PROCEDURE — 3078F DIAST BP <80 MM HG: CPT | Mod: CPTII,S$GLB,, | Performed by: EMERGENCY MEDICINE

## 2022-03-11 PROCEDURE — 87651 POCT STREP A MOLECULAR: ICD-10-PCS | Mod: QW,S$GLB,, | Performed by: EMERGENCY MEDICINE

## 2022-03-11 PROCEDURE — 3077F PR MOST RECENT SYSTOLIC BLOOD PRESSURE >= 140 MM HG: ICD-10-PCS | Mod: CPTII,S$GLB,, | Performed by: EMERGENCY MEDICINE

## 2022-03-11 PROCEDURE — 99212 OFFICE O/P EST SF 10 MIN: CPT | Mod: S$GLB,,, | Performed by: EMERGENCY MEDICINE

## 2022-03-11 PROCEDURE — 1160F RVW MEDS BY RX/DR IN RCRD: CPT | Mod: CPTII,S$GLB,, | Performed by: EMERGENCY MEDICINE

## 2022-03-11 PROCEDURE — 1159F MED LIST DOCD IN RCRD: CPT | Mod: CPTII,S$GLB,, | Performed by: EMERGENCY MEDICINE

## 2022-03-11 PROCEDURE — 87651 STREP A DNA AMP PROBE: CPT | Mod: QW,S$GLB,, | Performed by: EMERGENCY MEDICINE

## 2022-03-11 PROCEDURE — 1160F PR REVIEW ALL MEDS BY PRESCRIBER/CLIN PHARMACIST DOCUMENTED: ICD-10-PCS | Mod: CPTII,S$GLB,, | Performed by: EMERGENCY MEDICINE

## 2022-03-11 PROCEDURE — 1159F PR MEDICATION LIST DOCUMENTED IN MEDICAL RECORD: ICD-10-PCS | Mod: CPTII,S$GLB,, | Performed by: EMERGENCY MEDICINE

## 2022-03-11 RX ORDER — FLUCONAZOLE 150 MG/1
150 TABLET ORAL DAILY
Qty: 2 TABLET | Refills: 0 | Status: SHIPPED | OUTPATIENT
Start: 2022-03-11 | End: 2022-03-13

## 2022-03-11 RX ORDER — AMOXICILLIN 500 MG/1
500 CAPSULE ORAL EVERY 12 HOURS
Qty: 20 CAPSULE | Refills: 0 | Status: SHIPPED | OUTPATIENT
Start: 2022-03-11 | End: 2022-03-21

## 2022-03-11 NOTE — PATIENT INSTRUCTIONS
Amoxicillin as prescribed.  Take the entire 10 days.  Diflucan as prescribed if you develop a yeast infection.  Warm saltwater gargles.  Do this 2 to 3 times a day  Tylenol or Motrin for fever or pain per label instructions.  Avoid mouth-to-mouth contact including kissing mouth, sharing utensils or drinks.  You are contagious until you have been on antibiotics for over 24 hr.  Change your toothbrush on day 3 of antibiotics.       Follow up with your PCP in 2-3 days if symptoms are not improving.  ER for any worsening, particularly increase in swelling.      Pharyngitis: Strep (Confirmed)    You have had a positive test for strep throat. Strep throat is a contagious illness. It is spread by coughing, kissing or by touching others after touching your mouth or nose. Symptoms include throat pain that is worse with swallowing, aching all over, headache, and fever. It is treated with antibiotic medicine. This should help you start to feel better in 1 to 2 days.  Home care  Rest at home. Drink plenty of fluids to you won't get dehydrated.  No work or school for the first 2 days of taking the antibiotics. After this time, you will not be contagious. You can then return to school or work if you are feeling better.   Take antibiotic medicine for the full 10 days, even if you feel better. This is very important to ensure the infection is treated. It is also important to prevent medicine-resistant germs from developing. If you were given an antibiotic shot, you don't need any more antibiotics.  You may use acetaminophen or ibuprofen to control pain or fever, unless another medicine was prescribed for this. Talk with your doctor before taking these medicines if you have chronic liver or kidney disease. Also talk with your doctor if you have had a stomach ulcer or GI bleeding.  Throat lozenges or sprays help reduce pain. Gargling with warm saltwater will also reduce throat pain. Dissolve 1/2 teaspoon of salt in 1 glass of warm  water. This may be useful just before meals.   Soft foods are OK. Avoid salty or spicy foods.  Follow-up care  Follow up with your healthcare provider or our staff if you don't get better over the next week.  When to seek medical advice  Call your healthcare provider right away if any of these occur:  Fever of 100.4ºF (38ºC) or higher, or as directed by your healthcare provider  New or worsening ear pain, sinus pain, or headache  Painful lumps in the back of neck  Stiff neck  Lymph nodes getting larger or becoming soft in the middle  You can't swallow liquids or you can't open your mouth wide because of throat pain  Signs of dehydration. These include very dark urine or no urine, sunken eyes, and dizziness.  Trouble breathing or noisy breathing  Muffled voice  Rash  Date Last Reviewed: 4/13/2015  © 3308-1264 [employer]. [address] [place] [address]. All rights reserved. This information is not intended as a substitute for professional medical care. Always follow your healthcare professional's instructions.

## 2022-03-11 NOTE — PROGRESS NOTES
"Subjective:       Patient ID: Mini Aleman is a 56 y.o. female.    Vitals:  height is 5' 7" (1.702 m) and weight is 115.7 kg (255 lb). Her tympanic temperature is 98.3 °F (36.8 °C). Her blood pressure is 152/78 (abnormal) and her pulse is 68. Her respiration is 18 and oxygen saturation is 96%.     Chief Complaint: Sore Throat    Patient presents to Urgent Care this evening for sore throat and swollen glands that began on Tuesday 03.08.2022. Patient was seen in [facility] that same day as well. Patient states that the pain and severity of the sore throat has gotten worse and the swelling in the glands began Wednesday 03.09.2022. Patient has tried OTC Mucinex and Dayquil. Patient states that the Dayquil did assist slightly with the symptoms but did not last long, her last dose was approx 5am. 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 Patient states the pain in her glands is more severe on the left side, but, both sides hurt horribly.     Sore Throat   This is a new problem. The current episode started in the past 7 days. The problem has been rapidly worsening. The pain is worse on the left side. There has been no fever. The pain is at a severity of 8/10. The pain is moderate. Associated symptoms include coughing, ear discharge, ear pain, headaches, neck pain, swollen glands and trouble swallowing. Pertinent negatives include no abdominal pain, congestion, diarrhea, drooling, hoarse voice, plugged ear sensation, shortness of breath, stridor or vomiting. She has had exposure to strep. She has had no exposure to mono. She has tried acetaminophen for the symptoms. The treatment provided mild relief.       Constitution: Positive for fever ( Resolved Tuesday).   HENT: Positive for ear pain, ear discharge, sore throat and trouble swallowing. Negative for drooling and congestion.    Neck: Positive for neck pain. "   Respiratory: Positive for cough. Negative for shortness of breath and stridor.    Gastrointestinal: Negative for abdominal pain, vomiting and diarrhea.   Neurological: Positive for headaches.       Objective:      Physical Exam   Constitutional: She is oriented to person, place, and time. She appears well-developed. She is cooperative.  Non-toxic appearance. She does not appear ill. No distress.   HENT:   Head: Normocephalic and atraumatic.   Ears:   Right Ear: Hearing and external ear normal.   Left Ear: Hearing and external ear normal.   Nose: No mucosal edema, rhinorrhea, nasal deformity or congestion. No epistaxis. Right sinus exhibits no maxillary sinus tenderness and no frontal sinus tenderness. Left sinus exhibits no maxillary sinus tenderness and no frontal sinus tenderness.   Mouth/Throat: Uvula is midline and mucous membranes are normal. No trismus in the jaw. Normal dentition. No uvula swelling. Posterior oropharyngeal erythema present. No oropharyngeal exudate or posterior oropharyngeal edema.      Comments: Oropharynx beefy red.  Exudate on the left  Eyes: Conjunctivae and lids are normal. No scleral icterus.      extraocular movement intact   Neck: Trachea normal and phonation normal. Neck supple. No edema present. No erythema present. No neck rigidity present.   Cardiovascular: Normal rate, regular rhythm, normal heart sounds and normal pulses.   Pulmonary/Chest: Effort normal and breath sounds normal. No respiratory distress. She has no decreased breath sounds. She has no rhonchi.   Abdominal: Normal appearance.   Musculoskeletal: Normal range of motion.         General: No deformity. Normal range of motion.   Lymphadenopathy:     She has cervical adenopathy ( On the left).   Neurological: She is alert and oriented to person, place, and time. She exhibits normal muscle tone. Coordination normal.   Skin: Skin is warm, dry, intact, not diaphoretic and not pale.   Psychiatric: Her speech is normal and  behavior is normal. Judgment and thought content normal.   Nursing note and vitals reviewed.        Assessment:       1. Strep pharyngitis    2. Sore throat    3. Glands swollen        Results for orders placed or performed in visit on 03/11/22   POCT Strep A, Molecular   Result Value Ref Range    Molecular Strep A, POC Positive (A) Negative     Acceptable Yes          Plan:         Strep pharyngitis  -     amoxicillin (AMOXIL) 500 MG capsule; Take 1 capsule (500 mg total) by mouth every 12 (twelve) hours. for 10 days  Dispense: 20 capsule; Refill: 0  -     fluconazole (DIFLUCAN) 150 MG Tab; Take 1 tablet (150 mg total) by mouth once daily. Take one tablet at the first sign of yeast infection. Repeat dose in 5 days prn. for 2 doses  Dispense: 2 tablet; Refill: 0    Sore throat  -     POCT Strep A, Molecular    Glands swollen  -     POCT Strep A, Molecular

## 2022-03-13 ENCOUNTER — TELEPHONE (OUTPATIENT)
Dept: URGENT CARE | Facility: CLINIC | Age: 57
End: 2022-03-13
Payer: COMMERCIAL

## 2022-03-13 NOTE — TELEPHONE ENCOUNTER
Called to check on patient.  States that her throat has just started feeling better.  Reminded her to change her toothbrush tomorrow and finish antibiotics.

## 2022-04-08 ENCOUNTER — OFFICE VISIT (OUTPATIENT)
Dept: URGENT CARE | Facility: CLINIC | Age: 57
End: 2022-04-08
Payer: COMMERCIAL

## 2022-04-08 VITALS
TEMPERATURE: 99 F | DIASTOLIC BLOOD PRESSURE: 65 MMHG | WEIGHT: 255.06 LBS | BODY MASS INDEX: 40.03 KG/M2 | OXYGEN SATURATION: 96 % | RESPIRATION RATE: 18 BRPM | HEART RATE: 78 BPM | SYSTOLIC BLOOD PRESSURE: 142 MMHG | HEIGHT: 67 IN

## 2022-04-08 DIAGNOSIS — J02.9 SORE THROAT: ICD-10-CM

## 2022-04-08 DIAGNOSIS — R05.9 COUGH: ICD-10-CM

## 2022-04-08 DIAGNOSIS — J10.1 INFLUENZA A: Primary | ICD-10-CM

## 2022-04-08 LAB
CTP QC/QA: YES
CTP QC/QA: YES
MOLECULAR STREP A: NEGATIVE
POC MOLECULAR INFLUENZA A AGN: POSITIVE
POC MOLECULAR INFLUENZA B AGN: NEGATIVE

## 2022-04-08 PROCEDURE — 1159F PR MEDICATION LIST DOCUMENTED IN MEDICAL RECORD: ICD-10-PCS | Mod: CPTII,S$GLB,, | Performed by: PHYSICIAN ASSISTANT

## 2022-04-08 PROCEDURE — 99214 PR OFFICE/OUTPT VISIT, EST, LEVL IV, 30-39 MIN: ICD-10-PCS | Mod: S$GLB,,, | Performed by: PHYSICIAN ASSISTANT

## 2022-04-08 PROCEDURE — 1159F MED LIST DOCD IN RCRD: CPT | Mod: CPTII,S$GLB,, | Performed by: PHYSICIAN ASSISTANT

## 2022-04-08 PROCEDURE — 3078F DIAST BP <80 MM HG: CPT | Mod: CPTII,S$GLB,, | Performed by: PHYSICIAN ASSISTANT

## 2022-04-08 PROCEDURE — 1160F PR REVIEW ALL MEDS BY PRESCRIBER/CLIN PHARMACIST DOCUMENTED: ICD-10-PCS | Mod: CPTII,S$GLB,, | Performed by: PHYSICIAN ASSISTANT

## 2022-04-08 PROCEDURE — 87502 POCT INFLUENZA A/B MOLECULAR: ICD-10-PCS | Mod: QW,S$GLB,, | Performed by: PHYSICIAN ASSISTANT

## 2022-04-08 PROCEDURE — 87651 POCT STREP A MOLECULAR: ICD-10-PCS | Mod: QW,S$GLB,, | Performed by: PHYSICIAN ASSISTANT

## 2022-04-08 PROCEDURE — 3077F SYST BP >= 140 MM HG: CPT | Mod: CPTII,S$GLB,, | Performed by: PHYSICIAN ASSISTANT

## 2022-04-08 PROCEDURE — 87502 INFLUENZA DNA AMP PROBE: CPT | Mod: QW,S$GLB,, | Performed by: PHYSICIAN ASSISTANT

## 2022-04-08 PROCEDURE — 3072F PR LOW RISK FOR RETINOPATHY: ICD-10-PCS | Mod: CPTII,S$GLB,, | Performed by: PHYSICIAN ASSISTANT

## 2022-04-08 PROCEDURE — 87651 STREP A DNA AMP PROBE: CPT | Mod: QW,S$GLB,, | Performed by: PHYSICIAN ASSISTANT

## 2022-04-08 PROCEDURE — 1160F RVW MEDS BY RX/DR IN RCRD: CPT | Mod: CPTII,S$GLB,, | Performed by: PHYSICIAN ASSISTANT

## 2022-04-08 PROCEDURE — 3008F BODY MASS INDEX DOCD: CPT | Mod: CPTII,S$GLB,, | Performed by: PHYSICIAN ASSISTANT

## 2022-04-08 PROCEDURE — 99214 OFFICE O/P EST MOD 30 MIN: CPT | Mod: S$GLB,,, | Performed by: PHYSICIAN ASSISTANT

## 2022-04-08 PROCEDURE — 3078F PR MOST RECENT DIASTOLIC BLOOD PRESSURE < 80 MM HG: ICD-10-PCS | Mod: CPTII,S$GLB,, | Performed by: PHYSICIAN ASSISTANT

## 2022-04-08 PROCEDURE — 3008F PR BODY MASS INDEX (BMI) DOCUMENTED: ICD-10-PCS | Mod: CPTII,S$GLB,, | Performed by: PHYSICIAN ASSISTANT

## 2022-04-08 PROCEDURE — 3072F LOW RISK FOR RETINOPATHY: CPT | Mod: CPTII,S$GLB,, | Performed by: PHYSICIAN ASSISTANT

## 2022-04-08 PROCEDURE — 3077F PR MOST RECENT SYSTOLIC BLOOD PRESSURE >= 140 MM HG: ICD-10-PCS | Mod: CPTII,S$GLB,, | Performed by: PHYSICIAN ASSISTANT

## 2022-04-08 RX ORDER — BENZONATATE 200 MG/1
200 CAPSULE ORAL 3 TIMES DAILY PRN
Qty: 21 CAPSULE | Refills: 0 | Status: SHIPPED | OUTPATIENT
Start: 2022-04-08 | End: 2022-04-15

## 2022-04-08 RX ORDER — OSELTAMIVIR PHOSPHATE 75 MG/1
75 CAPSULE ORAL 2 TIMES DAILY
Qty: 10 CAPSULE | Refills: 0 | Status: SHIPPED | OUTPATIENT
Start: 2022-04-08 | End: 2022-04-13

## 2022-04-08 NOTE — PROGRESS NOTES
"Subjective:       Patient ID: Mini Aleman is a 56 y.o. female.    Vitals:  height is 5' 7" (1.702 m) and weight is 115.7 kg (255 lb 1.2 oz). Her tympanic temperature is 98.8 °F (37.1 °C). Her blood pressure is 142/65 (abnormal) and her pulse is 78. Her respiration is 18 and oxygen saturation is 96%.     Chief Complaint: Sinus Problem    Mini Aleman is a 56 year old female who presents with sore throat, nasal congestion, and cough that started at noon yesterday.  No fever but mild chills.  States her granddaughter recently had the flu.  She was also treated for strep about 3 weeks ago with initial resolution of symptoms.  She has tried OtC meds with minimal relief.    Sinus Problem  The current episode started yesterday. The problem has been gradually worsening since onset. Her pain is at a severity of 6/10. Associated symptoms include chills, congestion, coughing, headaches, sinus pressure, sneezing and a sore throat. Pertinent negatives include no diaphoresis, ear pain, hoarse voice, neck pain, shortness of breath or swollen glands. Past treatments include nothing. The treatment provided no relief.       Constitution: Positive for chills. Negative for sweating.   HENT: Positive for congestion, sinus pressure and sore throat. Negative for ear pain.    Neck: Negative for neck pain.   Respiratory: Positive for cough. Negative for shortness of breath.    Allergic/Immunologic: Positive for sneezing.   Neurological: Positive for headaches.       Objective:      Physical Exam   Constitutional: She is oriented to person, place, and time. She appears well-developed.   HENT:   Head: Normocephalic and atraumatic.   Ears:   Right Ear: Tympanic membrane, external ear and ear canal normal.   Left Ear: Tympanic membrane, external ear and ear canal normal.   Nose: Nose normal.   Mouth/Throat: Uvula is midline and mucous membranes are normal. Posterior oropharyngeal erythema present. No oropharyngeal exudate. No tonsillar " exudate.   Eyes: EOM are normal. Pupils are equal, round, and reactive to light.   Neck: Neck supple.   Cardiovascular: Normal rate and regular rhythm.   Pulmonary/Chest: Effort normal and breath sounds normal.   Abdominal: Bowel sounds are normal. Soft.   Musculoskeletal: Normal range of motion.         General: Normal range of motion.   Lymphadenopathy:     She has no cervical adenopathy.   Neurological: She is alert and oriented to person, place, and time.   Skin: Skin is warm and dry.   Vitals reviewed.        Assessment:       1. Influenza A    2. Cough    3. Sore throat          Plan:         Influenza A  -     oseltamivir (TAMIFLU) 75 MG capsule; Take 1 capsule (75 mg total) by mouth 2 (two) times daily. for 5 days  Dispense: 10 capsule; Refill: 0    Cough  -     POCT Influenza A/B Molecular  -     benzonatate (TESSALON) 200 MG capsule; Take 1 capsule (200 mg total) by mouth 3 (three) times daily as needed for Cough.  Dispense: 21 capsule; Refill: 0    Sore throat  -     POCT Strep A, Molecular      Results for orders placed or performed in visit on 04/08/22   POCT Influenza A/B Molecular   Result Value Ref Range    POC Molecular Influenza A Ag Positive (A) Negative, Not Reported    POC Molecular Influenza B Ag Negative Negative, Not Reported     Acceptable Yes    POCT Strep A, Molecular   Result Value Ref Range    Molecular Strep A, POC Negative Negative     Acceptable Yes         Tamiflu and Tessalon perles sent to pharmacy.        Increase fluids.  Get plenty of rest.   Normal saline nasal wash to irrigate sinuses and for congestion/runny nose.  Cool mist humidifier/vaporizer.  Practice good handwashing.  Mucinex for cough and chest congestion.  Tylenol or Ibuprofen for fever, headache and body aches.  Warm salt water gargles for throat comfort.  Chloraseptic spray or lozenges for throat comfort.  See PCP or go to ER if symptoms worsen or fail to improve with treatment.

## 2022-04-11 ENCOUNTER — TELEPHONE (OUTPATIENT)
Dept: URGENT CARE | Facility: CLINIC | Age: 57
End: 2022-04-11
Payer: COMMERCIAL

## 2022-04-14 ENCOUNTER — HOSPITAL ENCOUNTER (OUTPATIENT)
Dept: RADIOLOGY | Facility: CLINIC | Age: 57
Discharge: HOME | End: 2022-04-14
Attending: PHYSICIAN ASSISTANT

## 2022-04-14 ENCOUNTER — OFFICE VISIT (OUTPATIENT)
Dept: URGENT CARE | Facility: CLINIC | Age: 57
End: 2022-04-14
Payer: COMMERCIAL

## 2022-04-14 VITALS
OXYGEN SATURATION: 97 % | WEIGHT: 255 LBS | DIASTOLIC BLOOD PRESSURE: 63 MMHG | SYSTOLIC BLOOD PRESSURE: 134 MMHG | HEIGHT: 67 IN | BODY MASS INDEX: 40.02 KG/M2 | RESPIRATION RATE: 14 BRPM | TEMPERATURE: 98 F | HEART RATE: 69 BPM

## 2022-04-14 DIAGNOSIS — R07.89 ATYPICAL CHEST PAIN: ICD-10-CM

## 2022-04-14 DIAGNOSIS — R07.89 ATYPICAL CHEST PAIN: Primary | ICD-10-CM

## 2022-04-14 DIAGNOSIS — J20.9 ACUTE BRONCHITIS, UNSPECIFIED ORGANISM: ICD-10-CM

## 2022-04-14 PROCEDURE — 3008F BODY MASS INDEX DOCD: CPT | Mod: CPTII,S$GLB,, | Performed by: PHYSICIAN ASSISTANT

## 2022-04-14 PROCEDURE — 93005 EKG 12-LEAD: ICD-10-PCS | Mod: S$GLB,,, | Performed by: PHYSICIAN ASSISTANT

## 2022-04-14 PROCEDURE — 99214 OFFICE O/P EST MOD 30 MIN: CPT | Mod: S$GLB,,, | Performed by: PHYSICIAN ASSISTANT

## 2022-04-14 PROCEDURE — 3078F DIAST BP <80 MM HG: CPT | Mod: CPTII,S$GLB,, | Performed by: PHYSICIAN ASSISTANT

## 2022-04-14 PROCEDURE — 71046 XR CHEST PA AND LATERAL: ICD-10-PCS | Mod: S$GLB,,, | Performed by: RADIOLOGY

## 2022-04-14 PROCEDURE — 3075F SYST BP GE 130 - 139MM HG: CPT | Mod: CPTII,S$GLB,, | Performed by: PHYSICIAN ASSISTANT

## 2022-04-14 PROCEDURE — 3072F PR LOW RISK FOR RETINOPATHY: ICD-10-PCS | Mod: CPTII,S$GLB,, | Performed by: PHYSICIAN ASSISTANT

## 2022-04-14 PROCEDURE — 93010 ELECTROCARDIOGRAM REPORT: CPT | Mod: S$GLB,,, | Performed by: INTERNAL MEDICINE

## 2022-04-14 PROCEDURE — 1159F PR MEDICATION LIST DOCUMENTED IN MEDICAL RECORD: ICD-10-PCS | Mod: CPTII,S$GLB,, | Performed by: PHYSICIAN ASSISTANT

## 2022-04-14 PROCEDURE — 3075F PR MOST RECENT SYSTOLIC BLOOD PRESS GE 130-139MM HG: ICD-10-PCS | Mod: CPTII,S$GLB,, | Performed by: PHYSICIAN ASSISTANT

## 2022-04-14 PROCEDURE — 3008F PR BODY MASS INDEX (BMI) DOCUMENTED: ICD-10-PCS | Mod: CPTII,S$GLB,, | Performed by: PHYSICIAN ASSISTANT

## 2022-04-14 PROCEDURE — 93005 ELECTROCARDIOGRAM TRACING: CPT | Mod: S$GLB,,, | Performed by: PHYSICIAN ASSISTANT

## 2022-04-14 PROCEDURE — 99214 PR OFFICE/OUTPT VISIT, EST, LEVL IV, 30-39 MIN: ICD-10-PCS | Mod: S$GLB,,, | Performed by: PHYSICIAN ASSISTANT

## 2022-04-14 PROCEDURE — 3072F LOW RISK FOR RETINOPATHY: CPT | Mod: CPTII,S$GLB,, | Performed by: PHYSICIAN ASSISTANT

## 2022-04-14 PROCEDURE — 93010 EKG 12-LEAD: ICD-10-PCS | Mod: S$GLB,,, | Performed by: INTERNAL MEDICINE

## 2022-04-14 PROCEDURE — 1159F MED LIST DOCD IN RCRD: CPT | Mod: CPTII,S$GLB,, | Performed by: PHYSICIAN ASSISTANT

## 2022-04-14 PROCEDURE — 71046 X-RAY EXAM CHEST 2 VIEWS: CPT | Mod: S$GLB,,, | Performed by: RADIOLOGY

## 2022-04-14 PROCEDURE — 3078F PR MOST RECENT DIASTOLIC BLOOD PRESSURE < 80 MM HG: ICD-10-PCS | Mod: CPTII,S$GLB,, | Performed by: PHYSICIAN ASSISTANT

## 2022-04-14 RX ORDER — PROMETHAZINE HYDROCHLORIDE AND DEXTROMETHORPHAN HYDROBROMIDE 6.25; 15 MG/5ML; MG/5ML
5 SYRUP ORAL EVERY 6 HOURS PRN
Qty: 140 ML | Refills: 0 | Status: SHIPPED | OUTPATIENT
Start: 2022-04-14 | End: 2022-04-21

## 2022-04-14 NOTE — PROGRESS NOTES
"Subjective:       Patient ID: Mini Almean is a 56 y.o. female.    Vitals:  height is 5' 7" (1.702 m) and weight is 115.7 kg (255 lb). Her temperature is 98.1 °F (36.7 °C). Her blood pressure is 134/63 and her pulse is 69. Her respiration is 14 and oxygen saturation is 97%.     Chief Complaint: Cough    Pt is a 57 yo with hx of SSS, HTN, hyperlipidemia and DM who was dx with Influenza A on 04/08/22 most symptoms such as fever and body aches have subsided. Pt still experiencing non productive cough and chest congestion with minor chest discomfort. Complains of constant central chest heaviness that is worsened with coughing and inspiration. Pt states she has been taking tamilflu for the instructed amount of days along with the tessalon perles and still experiencing a dry cough that is painful. No SOB, palpitations, coughing blood, syncope, calf pain/swelling or hx of DVT/PE    Cough  This is a new problem. The current episode started 1 to 4 weeks ago. The problem has been gradually improving. The cough is non-productive. Associated symptoms include chest pain, ear pain, a sore throat and sweats. Pertinent negatives include no chills, ear congestion, fever, headaches, heartburn, hemoptysis, myalgias, nasal congestion, postnasal drip, rash, rhinorrhea, shortness of breath, weight loss or wheezing. Nothing aggravates the symptoms. She has tried prescription cough suppressant for the symptoms. The treatment provided moderate relief.       Constitution: Negative for chills, fatigue and fever.   HENT: Positive for ear pain, congestion and sore throat. Negative for postnasal drip.    Neck: Negative for neck pain and neck stiffness.   Cardiovascular: Positive for chest pain. Negative for chest trauma, leg swelling, palpitations, sob on exertion and passing out.   Eyes: Negative for eye discharge, eye itching and eye pain.   Respiratory: Positive for chest tightness and cough. Negative for sputum production, bloody sputum, " shortness of breath and wheezing.    Gastrointestinal: Negative for abdominal pain, nausea, vomiting, diarrhea and heartburn.   Genitourinary: Negative for dysuria.   Musculoskeletal: Negative for muscle ache.   Skin: Negative for rash and wound.   Neurological: Negative for headaches.       Objective:      Physical Exam   Constitutional: She is oriented to person, place, and time. She appears well-developed. She is cooperative.  Non-toxic appearance. She does not appear ill. No distress.   HENT:   Head: Normocephalic and atraumatic.   Ears:   Right Ear: Hearing, tympanic membrane, external ear and ear canal normal.   Left Ear: Hearing, tympanic membrane, external ear and ear canal normal.   Nose: Congestion present. No mucosal edema, rhinorrhea or nasal deformity. No epistaxis. Right sinus exhibits no maxillary sinus tenderness and no frontal sinus tenderness. Left sinus exhibits no maxillary sinus tenderness and no frontal sinus tenderness.   Mouth/Throat: Uvula is midline, oropharynx is clear and moist and mucous membranes are normal. No trismus in the jaw. Normal dentition. No uvula swelling. No oropharyngeal exudate, posterior oropharyngeal edema or posterior oropharyngeal erythema.   Eyes: Conjunctivae and lids are normal. No scleral icterus.   Neck: Trachea normal and phonation normal. Neck supple. No edema present. No erythema present. No neck rigidity present.   Cardiovascular: Normal rate, regular rhythm, normal heart sounds and normal pulses.   No murmur heard.  Pulses:       Radial pulses are 2+ on the right side and 2+ on the left side.   Pulmonary/Chest: Effort normal and breath sounds normal. No respiratory distress. She has no decreased breath sounds. She has no wheezes. She has no rhonchi. She has no rales.   Central anterior chest wall reproducible TTP. No rash, crepitus or deformities         Comments: Central anterior chest wall reproducible TTP. No rash, crepitus or deformities     Abdominal:  Normal appearance. There is no abdominal tenderness.   Musculoskeletal: Normal range of motion.         General: No deformity. Normal range of motion.      Right lower leg: No edema.      Left lower leg: No edema.   Neurological: She is alert and oriented to person, place, and time. She exhibits normal muscle tone. Coordination normal.   Skin: Skin is warm, dry, intact, not diaphoretic and not pale.   Psychiatric: Her speech is normal and behavior is normal. Judgment and thought content normal.   Nursing note and vitals reviewed.      X-Ray Chest PA And Lateral    Result Date: 4/14/2022  EXAMINATION: XR CHEST PA AND LATERAL CLINICAL HISTORY: Other chest pain TECHNIQUE: PA and lateral views of the chest were performed. COMPARISON: Chest radiograph June 11, 2012 FINDINGS: Lungs are clear without focal consolidation, edema, or mass.  There is no pneumothorax or pleural effusion.  Cardiomediastinal silhouette is within normal limits.  No acute osseous abnormality.  Bilateral cervical ribs are again noted.     As above. Electronically signed by: Maury Langford Date:    04/14/2022 Time:    16:48    ECG reviewed and interpreted by myself: NSR, HR 63 bpm. NO STEMI. Non specific T wave changes V1-V2 inversion, V5 inversion. No previous for comparison.     Assessment:       1. Atypical chest pain    2. Acute bronchitis, unspecified organism          Plan:       Atypical reproducible chest wall pain. ecg no stemi. Did explain there were non specific changes but we have no previous ECG for comparison. She does see cardiology regularly and has had normal echos and stress in the past. Advised she take tylenol or motrin for pain, given antitussives. Given ER precautions for any progressive or alarm symptoms.     Atypical chest pain  -     EKG 12-lead  -     X-Ray Chest PA And Lateral; Future; Expected date: 04/14/2022    Acute bronchitis, unspecified organism    Other orders  -     promethazine-dextromethorphan (PROMETHAZINE-DM)  6.25-15 mg/5 mL Syrp; Take 5 mLs by mouth every 6 (six) hours as needed (cough).  Dispense: 140 mL; Refill: 0    Caroline Fusilier PA-C [facility]         Patient Instructions   You may take PROMETHAZINE DM every 4-6 hours as needed for cough (may cause drowsiness). Add mucinex to break up congestion. You should avoid decongestants (Sudafed/pseudoephedrine or products with -D) with your history of tachycardia and high blood pressure. You may take tylenol or motrin for chest heaviness.     Pain is more consistent with muscular pain from coughing. Your ECG and chest xray look good here.    You should seek urgent re-evaluation if fever, shortness of breath, palpitations, severe chest pain or passing out.

## 2022-04-14 NOTE — PATIENT INSTRUCTIONS
You may take PROMETHAZINE DM every 4-6 hours as needed for cough (may cause drowsiness). Add mucinex to break up congestion. You should avoid decongestants (Sudafed/pseudoephedrine or products with -D) with your history of tachycardia and high blood pressure. You may take tylenol or motrin for chest heaviness.     Pain is more consistent with muscular pain from coughing. Your ECG and chest xray look good here.    You should seek urgent re-evaluation if fever, shortness of breath, palpitations, severe chest pain or passing out.

## 2022-04-18 ENCOUNTER — TELEPHONE (OUTPATIENT)
Dept: URGENT CARE | Facility: CLINIC | Age: 57
End: 2022-04-18
Payer: COMMERCIAL

## 2022-07-27 ENCOUNTER — PATIENT MESSAGE (OUTPATIENT)
Dept: ADMINISTRATIVE | Facility: HOSPITAL | Age: 57
End: 2022-07-27
Payer: COMMERCIAL

## 2022-10-20 ENCOUNTER — PATIENT MESSAGE (OUTPATIENT)
Dept: ADMINISTRATIVE | Facility: HOSPITAL | Age: 57
End: 2022-10-20
Payer: COMMERCIAL

## 2022-12-02 ENCOUNTER — OFFICE VISIT (OUTPATIENT)
Dept: URGENT CARE | Facility: CLINIC | Age: 57
End: 2022-12-02
Payer: COMMERCIAL

## 2022-12-02 VITALS
OXYGEN SATURATION: 95 % | TEMPERATURE: 98 F | DIASTOLIC BLOOD PRESSURE: 66 MMHG | WEIGHT: 250 LBS | HEART RATE: 70 BPM | SYSTOLIC BLOOD PRESSURE: 138 MMHG | RESPIRATION RATE: 18 BRPM | BODY MASS INDEX: 39.24 KG/M2 | HEIGHT: 67 IN

## 2022-12-02 DIAGNOSIS — R05.9 COUGH, UNSPECIFIED TYPE: Primary | ICD-10-CM

## 2022-12-02 DIAGNOSIS — B96.89 BACTERIAL UPPER RESPIRATORY INFECTION: ICD-10-CM

## 2022-12-02 DIAGNOSIS — J06.9 BACTERIAL UPPER RESPIRATORY INFECTION: ICD-10-CM

## 2022-12-02 LAB
CTP QC/QA: YES
SARS-COV-2 AG RESP QL IA.RAPID: NEGATIVE

## 2022-12-02 PROCEDURE — 3072F PR LOW RISK FOR RETINOPATHY: ICD-10-PCS | Mod: CPTII,S$GLB,, | Performed by: PHYSICIAN ASSISTANT

## 2022-12-02 PROCEDURE — 1160F RVW MEDS BY RX/DR IN RCRD: CPT | Mod: CPTII,S$GLB,, | Performed by: PHYSICIAN ASSISTANT

## 2022-12-02 PROCEDURE — 99214 PR OFFICE/OUTPT VISIT, EST, LEVL IV, 30-39 MIN: ICD-10-PCS | Mod: S$GLB,,, | Performed by: PHYSICIAN ASSISTANT

## 2022-12-02 PROCEDURE — 3075F SYST BP GE 130 - 139MM HG: CPT | Mod: CPTII,S$GLB,, | Performed by: PHYSICIAN ASSISTANT

## 2022-12-02 PROCEDURE — 3008F BODY MASS INDEX DOCD: CPT | Mod: CPTII,S$GLB,, | Performed by: PHYSICIAN ASSISTANT

## 2022-12-02 PROCEDURE — 3008F PR BODY MASS INDEX (BMI) DOCUMENTED: ICD-10-PCS | Mod: CPTII,S$GLB,, | Performed by: PHYSICIAN ASSISTANT

## 2022-12-02 PROCEDURE — 99214 OFFICE O/P EST MOD 30 MIN: CPT | Mod: S$GLB,,, | Performed by: PHYSICIAN ASSISTANT

## 2022-12-02 PROCEDURE — 3072F LOW RISK FOR RETINOPATHY: CPT | Mod: CPTII,S$GLB,, | Performed by: PHYSICIAN ASSISTANT

## 2022-12-02 PROCEDURE — 1160F PR REVIEW ALL MEDS BY PRESCRIBER/CLIN PHARMACIST DOCUMENTED: ICD-10-PCS | Mod: CPTII,S$GLB,, | Performed by: PHYSICIAN ASSISTANT

## 2022-12-02 PROCEDURE — 3075F PR MOST RECENT SYSTOLIC BLOOD PRESS GE 130-139MM HG: ICD-10-PCS | Mod: CPTII,S$GLB,, | Performed by: PHYSICIAN ASSISTANT

## 2022-12-02 PROCEDURE — 3078F PR MOST RECENT DIASTOLIC BLOOD PRESSURE < 80 MM HG: ICD-10-PCS | Mod: CPTII,S$GLB,, | Performed by: PHYSICIAN ASSISTANT

## 2022-12-02 PROCEDURE — 3078F DIAST BP <80 MM HG: CPT | Mod: CPTII,S$GLB,, | Performed by: PHYSICIAN ASSISTANT

## 2022-12-02 PROCEDURE — 1159F PR MEDICATION LIST DOCUMENTED IN MEDICAL RECORD: ICD-10-PCS | Mod: CPTII,S$GLB,, | Performed by: PHYSICIAN ASSISTANT

## 2022-12-02 PROCEDURE — 87811 SARS-COV-2 COVID19 W/OPTIC: CPT | Mod: QW,S$GLB,, | Performed by: PHYSICIAN ASSISTANT

## 2022-12-02 PROCEDURE — 87811 SARS CORONAVIRUS 2 ANTIGEN POCT, MANUAL READ: ICD-10-PCS | Mod: QW,S$GLB,, | Performed by: PHYSICIAN ASSISTANT

## 2022-12-02 PROCEDURE — 1159F MED LIST DOCD IN RCRD: CPT | Mod: CPTII,S$GLB,, | Performed by: PHYSICIAN ASSISTANT

## 2022-12-02 RX ORDER — AMOXICILLIN AND CLAVULANATE POTASSIUM 875; 125 MG/1; MG/1
1 TABLET, FILM COATED ORAL 2 TIMES DAILY
Qty: 14 TABLET | Refills: 0 | Status: SHIPPED | OUTPATIENT
Start: 2022-12-02 | End: 2022-12-09

## 2022-12-02 RX ORDER — BENZONATATE 100 MG/1
100 CAPSULE ORAL 3 TIMES DAILY PRN
Qty: 30 CAPSULE | Refills: 0 | Status: SHIPPED | OUTPATIENT
Start: 2022-12-02 | End: 2022-12-12

## 2022-12-02 NOTE — PROGRESS NOTES
"Subjective:       Patient ID: Mini Aleman is a 57 y.o. female.    Vitals:  height is 5' 7" (1.702 m) and weight is 113.4 kg (250 lb). Her oral temperature is 97.8 °F (36.6 °C). Her blood pressure is 138/66 and her pulse is 70. Her respiration is 18 and oxygen saturation is 95%.     Chief Complaint: Cough    Mini Aleman is presenting to urgent care with sxs of a productive cough with thick green sputum. Other associated sxs include congestion, postnasal drip, and sore throat which onset 5 days ago. Patient has been taking otc oral decongestants including mucinex.  Denies fever, body aches or chills.  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      Cough  This is a new problem. The current episode started in the past 7 days. The cough is Productive of sputum. Associated symptoms include nasal congestion, postnasal drip, rhinorrhea and a sore throat. Pertinent negatives include no chills, ear congestion, ear pain, headaches or wheezing. She has tried OTC cough suppressant for the symptoms. The treatment provided no relief. There is no history of asthma.     Constitution: Negative for chills.   HENT:  Positive for postnasal drip and sore throat. Negative for ear pain.    Respiratory:  Positive for cough. Negative for wheezing.    Neurological:  Negative for headaches.     Objective:      Physical Exam   Constitutional: She is oriented to person, place, and time. She appears well-developed.   HENT:   Head: Normocephalic and atraumatic.   Ears:   Right Ear: Tympanic membrane, external ear and ear canal normal.   Left Ear: Tympanic membrane, external ear and ear canal normal.   Nose: Mucosal edema and rhinorrhea present.   Mouth/Throat: Oropharynx is clear and moist and mucous membranes are normal. Mucous membranes are moist. Cobblestoning present.   Eyes: Conjunctivae and EOM are normal. Pupils are equal, round, and reactive to light.   Neck: Neck supple.   Cardiovascular: " Normal rate, regular rhythm, normal heart sounds and normal pulses.   Pulmonary/Chest: Effort normal and breath sounds normal.   Musculoskeletal: Normal range of motion.         General: Normal range of motion.   Neurological: She is alert and oriented to person, place, and time.   Skin: Skin is warm and dry.   Vitals reviewed.      Assessment:       1. Cough, unspecified type    2. Bacterial upper respiratory infection          Plan:         Cough, unspecified type  -     SARS Coronavirus 2 Antigen, POCT Manual Read  -     benzonatate (TESSALON) 100 MG capsule; Take 1 capsule (100 mg total) by mouth 3 (three) times daily as needed for Cough (Do not take more than 6 capsules in a 24 hour period.).  Dispense: 30 capsule; Refill: 0    Bacterial upper respiratory infection  -     benzonatate (TESSALON) 100 MG capsule; Take 1 capsule (100 mg total) by mouth 3 (three) times daily as needed for Cough (Do not take more than 6 capsules in a 24 hour period.).  Dispense: 30 capsule; Refill: 0  -     amoxicillin-clavulanate 875-125mg (AUGMENTIN) 875-125 mg per tablet; Take 1 tablet by mouth 2 (two) times daily. for 7 days  Dispense: 14 tablet; Refill: 0       Results for orders placed or performed in visit on 12/02/22   SARS Coronavirus 2 Antigen, POCT Manual Read   Result Value Ref Range    SARS Coronavirus 2 Antigen Negative Negative     Acceptable Yes          Advise increase p.o. fluids--at least 64 ounces of water/juice & rest  Meds:  Augmentin and Tessalon perles sent to pharmacy.  Advise complete antibiotics.  Normal saline nasal wash to irrigate sinuses and for congestion/runny nose.  Cool mist humidifier/vaporizer.  Practice good handwashing.  Mucinex for cough and chest congestion.  Tylenol or Ibuprofen for fever, headache and body aches.  Warm salt water gargles for throat comfort.  Chloraseptic spray or lozenges for throat comfort.  See PCP or go to ER if symptoms worsen or fail to improve with  treatment.

## 2023-01-17 ENCOUNTER — TELEPHONE (OUTPATIENT)
Dept: INTERNAL MEDICINE | Facility: CLINIC | Age: 58
End: 2023-01-17
Payer: COMMERCIAL

## 2023-01-17 NOTE — TELEPHONE ENCOUNTER
Called patient to advise her of the prescription denial but there was no answer, left a voice message for patient to return call to the clinic.

## 2023-03-26 ENCOUNTER — OFFICE VISIT (OUTPATIENT)
Dept: URGENT CARE | Facility: CLINIC | Age: 58
End: 2023-03-26
Payer: COMMERCIAL

## 2023-03-26 VITALS
HEIGHT: 67 IN | DIASTOLIC BLOOD PRESSURE: 58 MMHG | OXYGEN SATURATION: 96 % | BODY MASS INDEX: 40.17 KG/M2 | TEMPERATURE: 100 F | HEART RATE: 80 BPM | SYSTOLIC BLOOD PRESSURE: 128 MMHG | RESPIRATION RATE: 14 BRPM | WEIGHT: 255.94 LBS

## 2023-03-26 DIAGNOSIS — J02.9 SORE THROAT: ICD-10-CM

## 2023-03-26 DIAGNOSIS — J02.0 STREP PHARYNGITIS: Primary | ICD-10-CM

## 2023-03-26 LAB
CTP QC/QA: YES
CTP QC/QA: YES
MOLECULAR STREP A: POSITIVE
SARS-COV-2 AG RESP QL IA.RAPID: NEGATIVE

## 2023-03-26 PROCEDURE — 87651 POCT STREP A MOLECULAR: ICD-10-PCS | Mod: QW,S$GLB,,

## 2023-03-26 PROCEDURE — 87811 SARS-COV-2 COVID19 W/OPTIC: CPT | Mod: QW,S$GLB,,

## 2023-03-26 PROCEDURE — 99213 PR OFFICE/OUTPT VISIT, EST, LEVL III, 20-29 MIN: ICD-10-PCS | Mod: S$GLB,,,

## 2023-03-26 PROCEDURE — 87811 SARS CORONAVIRUS 2 ANTIGEN POCT, MANUAL READ: ICD-10-PCS | Mod: QW,S$GLB,,

## 2023-03-26 PROCEDURE — 87651 STREP A DNA AMP PROBE: CPT | Mod: QW,S$GLB,,

## 2023-03-26 PROCEDURE — 99213 OFFICE O/P EST LOW 20 MIN: CPT | Mod: S$GLB,,,

## 2023-03-26 RX ORDER — AMOXICILLIN 500 MG/1
500 CAPSULE ORAL EVERY 12 HOURS
Qty: 20 CAPSULE | Refills: 0 | Status: SHIPPED | OUTPATIENT
Start: 2023-03-26 | End: 2023-04-05

## 2023-03-26 NOTE — PATIENT INSTRUCTIONS
Pharyngitis   If your condition worsens or fails to improve we recommend that you receive another evaluation at the ER immediately or contact your PCP to discuss your concerns or return here. You must understand that you've received an urgent care treatment only and that you may be released before all your medical problems are known or treated. You the patient will arrange for followup care as instructed.   The majority of all sore throats or tonsillitis are viral and antibiotics will not treat this.     If the strep test performed in office was Positive:  - Complete the full course of antibiotics given  - Drink plenty of cool liquids while avoid any beverage or food that can irritate your throat (acidic, spicy or salty foods).  - Throw away your toothbrush now and when you complete your antibiotics.    If the strep culture was done and returns negative in 3-5 days and you are still having a sore throat, you may need to get a mono spot test done or repeated.   Tylenol or ibuprofen for pain may help as long as you are not allergic to these meds or have a medical condition such as stomach ulcers, liver or kidney disease or taking blood thinners etc that would   prevent you from using these medications.   Rest and fluids will help as well.   If you were prescribed antibiotics and are female and on BCP use additional methods to prevent pregnancy while on the antibiotics and for one cycle after.

## 2023-03-26 NOTE — PROGRESS NOTES
"Subjective:       Patient ID: Mini Aleman is a 57 y.o. female.    Vitals:  height is 5' 7" (1.702 m) and weight is 116.1 kg (255 lb 15.3 oz). Her temperature is 100.2 °F (37.9 °C). Her blood pressure is 128/58 (abnormal) and her pulse is 80. Her respiration is 14 and oxygen saturation is 96%.     Chief Complaint: No chief complaint on file.    Onset of symptoms 03/24/23. Pt is c/o sore throat,chest rody, non prod cough,headache,ear pain,runny nose,fatigue. Pt has not taken any medications to treat symptoms.      Sore Throat   This is a new problem. The current episode started in the past 7 days. The problem has been unchanged. The maximum temperature recorded prior to her arrival was 100.4 - 100.9 F. The fever has been present for Less than 1 day. Associated symptoms include congestion, ear pain, headaches, a hoarse voice, a plugged ear sensation, neck pain and swollen glands. She has tried nothing for the symptoms. The treatment provided no relief.     HENT:  Positive for ear pain, congestion and sore throat.    Neck: Positive for neck pain.   Neurological:  Positive for headaches.     Objective:      Physical Exam   Constitutional: She is oriented to person, place, and time. She appears well-developed. She is cooperative.  Non-toxic appearance. She does not appear ill. No distress.   HENT:   Head: Normocephalic and atraumatic.   Ears:   Right Ear: Hearing, tympanic membrane, external ear and ear canal normal.   Left Ear: Hearing, tympanic membrane, external ear and ear canal normal.   Nose: Nose normal. No mucosal edema, rhinorrhea or nasal deformity. No epistaxis. Right sinus exhibits no maxillary sinus tenderness and no frontal sinus tenderness. Left sinus exhibits no maxillary sinus tenderness and no frontal sinus tenderness.   Mouth/Throat: Uvula is midline and mucous membranes are normal. No trismus in the jaw. Normal dentition. No uvula swelling. Posterior oropharyngeal erythema present. No oropharyngeal " exudate or posterior oropharyngeal edema. Tonsils are 2+ on the right. Tonsils are 2+ on the left. Tonsillar exudate.   Eyes: Conjunctivae and lids are normal. No scleral icterus.   Neck: Trachea normal and phonation normal. Neck supple. No edema present. No erythema present. No neck rigidity present.   Cardiovascular: Normal rate, regular rhythm, normal heart sounds and normal pulses.   Pulmonary/Chest: Effort normal and breath sounds normal. No respiratory distress. She has no decreased breath sounds. She has no rhonchi.   Abdominal: Normal appearance.   Musculoskeletal: Normal range of motion.         General: No deformity. Normal range of motion.   Neurological: She is alert and oriented to person, place, and time. She exhibits normal muscle tone. Coordination normal.   Skin: Skin is warm, dry, intact, not diaphoretic and not pale.   Psychiatric: Her speech is normal and behavior is normal. Judgment and thought content normal.   Nursing note and vitals reviewed.      Results for orders placed or performed in visit on 03/26/23   SARS Coronavirus 2 Antigen, POCT Manual Read   Result Value Ref Range    SARS Coronavirus 2 Antigen Negative Negative     Acceptable Yes    POCT Strep A, Molecular   Result Value Ref Range    Molecular Strep A, POC Positive (A) Negative     Acceptable Yes        Assessment:       1. Strep pharyngitis    2. Sore throat          Plan:         Labs reviewed with patient. RTC and ED precautions discussed. Discussed proper use and side effects of prescribed and OTC medications recommended for symptomatic relief.     Strep pharyngitis  -     amoxicillin (AMOXIL) 500 MG capsule; Take 1 capsule (500 mg total) by mouth every 12 (twelve) hours. for 10 days  Dispense: 20 capsule; Refill: 0  -     (Magic mouthwash) 1:1:1 diphenhydrAMINE(Benadryl) 12.5mg/5ml liq, aluminum & magnesium hydroxide-simethicone (Maalox), LIDOcaine viscous 2%; Swish and spit 10 mLs every 4  (four) hours as needed. for mouth sores  Dispense: 360 mL; Refill: 0    Sore throat  -     SARS Coronavirus 2 Antigen, POCT Manual Read  -     POCT Strep A, Molecular

## 2023-03-26 NOTE — LETTER
March 26, 2023      [facility]  [address], [address]  [place] [facility] [address]  Phone: [phone]  Fax: [phone]       Patient: Mini Aleman   YOB: 1965  Date of Visit: 03/26/2023    To Whom It May Concern:    Johan Aleman  was at [facility] on 03/26/2023. The patient may return to work/school on 03/28/23 with no restrictions. If you have any questions or concerns, or if I can be of further assistance, please do not hesitate to contact me.    Sincerely,          Kathe Mace PA-C

## 2023-03-29 ENCOUNTER — TELEPHONE (OUTPATIENT)
Dept: URGENT CARE | Facility: CLINIC | Age: 58
End: 2023-03-29
Payer: COMMERCIAL

## 2023-03-29 NOTE — TELEPHONE ENCOUNTER
Jorge Manzanares, Spoke with pt she states that she is doing better, She states that her glands are still swollen and back at work on today.

## 2023-04-03 ENCOUNTER — OFFICE VISIT (OUTPATIENT)
Dept: INTERNAL MEDICINE | Facility: CLINIC | Age: 58
End: 2023-04-03
Payer: COMMERCIAL

## 2023-04-03 VITALS
TEMPERATURE: 98 F | DIASTOLIC BLOOD PRESSURE: 70 MMHG | SYSTOLIC BLOOD PRESSURE: 124 MMHG | HEART RATE: 73 BPM | OXYGEN SATURATION: 97 % | WEIGHT: 250.44 LBS | BODY MASS INDEX: 37.96 KG/M2 | RESPIRATION RATE: 17 BRPM | HEIGHT: 68 IN

## 2023-04-03 DIAGNOSIS — H60.542 ECZEMA OF EXTERNAL EAR, LEFT: ICD-10-CM

## 2023-04-03 DIAGNOSIS — E66.01 SEVERE OBESITY (BMI 35.0-39.9) WITH COMORBIDITY: ICD-10-CM

## 2023-04-03 DIAGNOSIS — E11.69 DIABETES MELLITUS TYPE 2 IN OBESE: ICD-10-CM

## 2023-04-03 DIAGNOSIS — Z12.11 COLON CANCER SCREENING: ICD-10-CM

## 2023-04-03 DIAGNOSIS — E66.9 DIABETES MELLITUS TYPE 2 IN OBESE: ICD-10-CM

## 2023-04-03 DIAGNOSIS — Z00.00 ROUTINE GENERAL MEDICAL EXAMINATION AT A HEALTH CARE FACILITY: Primary | ICD-10-CM

## 2023-04-03 DIAGNOSIS — E11.59 HYPERTENSION ASSOCIATED WITH DIABETES: ICD-10-CM

## 2023-04-03 DIAGNOSIS — I15.2 HYPERTENSION ASSOCIATED WITH DIABETES: ICD-10-CM

## 2023-04-03 DIAGNOSIS — E11.69 HYPERLIPIDEMIA ASSOCIATED WITH TYPE 2 DIABETES MELLITUS: ICD-10-CM

## 2023-04-03 DIAGNOSIS — E78.5 HYPERLIPIDEMIA ASSOCIATED WITH TYPE 2 DIABETES MELLITUS: ICD-10-CM

## 2023-04-03 PROCEDURE — 1159F PR MEDICATION LIST DOCUMENTED IN MEDICAL RECORD: ICD-10-PCS | Mod: CPTII,S$GLB,, | Performed by: FAMILY MEDICINE

## 2023-04-03 PROCEDURE — 1159F MED LIST DOCD IN RCRD: CPT | Mod: CPTII,S$GLB,, | Performed by: FAMILY MEDICINE

## 2023-04-03 PROCEDURE — 3051F HG A1C>EQUAL 7.0%<8.0%: CPT | Mod: CPTII,S$GLB,, | Performed by: FAMILY MEDICINE

## 2023-04-03 PROCEDURE — 3008F BODY MASS INDEX DOCD: CPT | Mod: CPTII,S$GLB,, | Performed by: FAMILY MEDICINE

## 2023-04-03 PROCEDURE — 1160F RVW MEDS BY RX/DR IN RCRD: CPT | Mod: CPTII,S$GLB,, | Performed by: FAMILY MEDICINE

## 2023-04-03 PROCEDURE — 3008F PR BODY MASS INDEX (BMI) DOCUMENTED: ICD-10-PCS | Mod: CPTII,S$GLB,, | Performed by: FAMILY MEDICINE

## 2023-04-03 PROCEDURE — 3078F PR MOST RECENT DIASTOLIC BLOOD PRESSURE < 80 MM HG: ICD-10-PCS | Mod: CPTII,S$GLB,, | Performed by: FAMILY MEDICINE

## 2023-04-03 PROCEDURE — 99396 PR PREVENTIVE VISIT,EST,40-64: ICD-10-PCS | Mod: S$GLB,,, | Performed by: FAMILY MEDICINE

## 2023-04-03 PROCEDURE — 99999 PR PBB SHADOW E&M-EST. PATIENT-LVL V: CPT | Mod: PBBFAC,,, | Performed by: FAMILY MEDICINE

## 2023-04-03 PROCEDURE — 3078F DIAST BP <80 MM HG: CPT | Mod: CPTII,S$GLB,, | Performed by: FAMILY MEDICINE

## 2023-04-03 PROCEDURE — 99396 PREV VISIT EST AGE 40-64: CPT | Mod: S$GLB,,, | Performed by: FAMILY MEDICINE

## 2023-04-03 PROCEDURE — 3061F NEG MICROALBUMINURIA REV: CPT | Mod: CPTII,S$GLB,, | Performed by: FAMILY MEDICINE

## 2023-04-03 PROCEDURE — 99999 PR PBB SHADOW E&M-EST. PATIENT-LVL V: ICD-10-PCS | Mod: PBBFAC,,, | Performed by: FAMILY MEDICINE

## 2023-04-03 PROCEDURE — 3074F SYST BP LT 130 MM HG: CPT | Mod: CPTII,S$GLB,, | Performed by: FAMILY MEDICINE

## 2023-04-03 PROCEDURE — 3066F NEPHROPATHY DOC TX: CPT | Mod: CPTII,S$GLB,, | Performed by: FAMILY MEDICINE

## 2023-04-03 PROCEDURE — 3066F PR DOCUMENTATION OF TREATMENT FOR NEPHROPATHY: ICD-10-PCS | Mod: CPTII,S$GLB,, | Performed by: FAMILY MEDICINE

## 2023-04-03 PROCEDURE — 3074F PR MOST RECENT SYSTOLIC BLOOD PRESSURE < 130 MM HG: ICD-10-PCS | Mod: CPTII,S$GLB,, | Performed by: FAMILY MEDICINE

## 2023-04-03 PROCEDURE — 1160F PR REVIEW ALL MEDS BY PRESCRIBER/CLIN PHARMACIST DOCUMENTED: ICD-10-PCS | Mod: CPTII,S$GLB,, | Performed by: FAMILY MEDICINE

## 2023-04-03 PROCEDURE — 3061F PR NEG MICROALBUMINURIA RESULT DOCUMENTED/REVIEW: ICD-10-PCS | Mod: CPTII,S$GLB,, | Performed by: FAMILY MEDICINE

## 2023-04-03 PROCEDURE — 3051F PR MOST RECENT HEMOGLOBIN A1C LEVEL 7.0 - < 8.0%: ICD-10-PCS | Mod: CPTII,S$GLB,, | Performed by: FAMILY MEDICINE

## 2023-04-03 RX ORDER — FLUOCINOLONE ACETONIDE 0.11 MG/ML
5 OIL AURICULAR (OTIC) 2 TIMES DAILY
Qty: 20 ML | Refills: 0 | Status: SHIPPED | OUTPATIENT
Start: 2023-04-03

## 2023-04-03 NOTE — PATIENT INSTRUCTIONS
""The Obesity Code" by Dr. Jae Vu  "The Diabetes Code" by Dr. Jae Vu    12-13 hour overnight fast  "

## 2023-04-03 NOTE — ASSESSMENT & PLAN NOTE
Body mass index is 38.08 kg/m².    Wt Readings from Last 10 Encounters:   04/03/23 113.6 kg (250 lb 7.1 oz)   03/26/23 116.1 kg (255 lb 15.3 oz)   03/08/23 116.1 kg (256 lb)   12/02/22 113.4 kg (250 lb)   04/14/22 115.7 kg (255 lb)   04/08/22 115.7 kg (255 lb 1.2 oz)   03/11/22 115.7 kg (255 lb)   03/08/22 115.7 kg (255 lb)   03/07/22 115.7 kg (255 lb)   03/03/21 114.8 kg (252 lb 15.7 oz)

## 2023-04-05 DIAGNOSIS — E11.9 TYPE 2 DIABETES MELLITUS WITHOUT COMPLICATION: ICD-10-CM

## 2023-04-11 ENCOUNTER — LAB VISIT (OUTPATIENT)
Dept: LAB | Facility: HOSPITAL | Age: 58
End: 2023-04-11
Attending: FAMILY MEDICINE
Payer: COMMERCIAL

## 2023-04-11 DIAGNOSIS — E11.69 DIABETES MELLITUS TYPE 2 IN OBESE: ICD-10-CM

## 2023-04-11 DIAGNOSIS — E11.59 HYPERTENSION ASSOCIATED WITH DIABETES: ICD-10-CM

## 2023-04-11 DIAGNOSIS — E11.69 HYPERLIPIDEMIA ASSOCIATED WITH TYPE 2 DIABETES MELLITUS: ICD-10-CM

## 2023-04-11 DIAGNOSIS — E78.5 HYPERLIPIDEMIA ASSOCIATED WITH TYPE 2 DIABETES MELLITUS: ICD-10-CM

## 2023-04-11 DIAGNOSIS — I15.2 HYPERTENSION ASSOCIATED WITH DIABETES: ICD-10-CM

## 2023-04-11 DIAGNOSIS — E66.9 DIABETES MELLITUS TYPE 2 IN OBESE: ICD-10-CM

## 2023-04-11 LAB
ALBUMIN SERPL BCP-MCNC: 3.6 G/DL (ref 3.5–5.2)
ALP SERPL-CCNC: 60 U/L (ref 55–135)
ALT SERPL W/O P-5'-P-CCNC: 13 U/L (ref 10–44)
ANION GAP SERPL CALC-SCNC: 11 MMOL/L (ref 8–16)
AST SERPL-CCNC: 12 U/L (ref 10–40)
BASOPHILS # BLD AUTO: 0.06 K/UL (ref 0–0.2)
BASOPHILS NFR BLD: 0.8 % (ref 0–1.9)
BILIRUB SERPL-MCNC: 0.3 MG/DL (ref 0.1–1)
BUN SERPL-MCNC: 16 MG/DL (ref 6–20)
CALCIUM SERPL-MCNC: 9.7 MG/DL (ref 8.7–10.5)
CHLORIDE SERPL-SCNC: 100 MMOL/L (ref 95–110)
CHOLEST SERPL-MCNC: 240 MG/DL (ref 120–199)
CHOLEST/HDLC SERPL: 3.5 {RATIO} (ref 2–5)
CO2 SERPL-SCNC: 26 MMOL/L (ref 23–29)
CREAT SERPL-MCNC: 0.9 MG/DL (ref 0.5–1.4)
DIFFERENTIAL METHOD: NORMAL
EOSINOPHIL # BLD AUTO: 0.1 K/UL (ref 0–0.5)
EOSINOPHIL NFR BLD: 1.3 % (ref 0–8)
ERYTHROCYTE [DISTWIDTH] IN BLOOD BY AUTOMATED COUNT: 13.1 % (ref 11.5–14.5)
EST. GFR  (NO RACE VARIABLE): >60 ML/MIN/1.73 M^2
ESTIMATED AVG GLUCOSE: 163 MG/DL (ref 68–131)
GLUCOSE SERPL-MCNC: 161 MG/DL (ref 70–110)
HBA1C MFR BLD: 7.3 % (ref 4–5.6)
HCT VFR BLD AUTO: 39.1 % (ref 37–48.5)
HDLC SERPL-MCNC: 69 MG/DL (ref 40–75)
HDLC SERPL: 28.8 % (ref 20–50)
HGB BLD-MCNC: 12.7 G/DL (ref 12–16)
IMM GRANULOCYTES # BLD AUTO: 0.02 K/UL (ref 0–0.04)
IMM GRANULOCYTES NFR BLD AUTO: 0.3 % (ref 0–0.5)
LDLC SERPL CALC-MCNC: 136.6 MG/DL (ref 63–159)
LYMPHOCYTES # BLD AUTO: 2.4 K/UL (ref 1–4.8)
LYMPHOCYTES NFR BLD: 31 % (ref 18–48)
MCH RBC QN AUTO: 28.9 PG (ref 27–31)
MCHC RBC AUTO-ENTMCNC: 32.5 G/DL (ref 32–36)
MCV RBC AUTO: 89 FL (ref 82–98)
MONOCYTES # BLD AUTO: 0.5 K/UL (ref 0.3–1)
MONOCYTES NFR BLD: 6.5 % (ref 4–15)
NEUTROPHILS # BLD AUTO: 4.7 K/UL (ref 1.8–7.7)
NEUTROPHILS NFR BLD: 60.1 % (ref 38–73)
NONHDLC SERPL-MCNC: 171 MG/DL
NRBC BLD-RTO: 0 /100 WBC
PLATELET # BLD AUTO: 374 K/UL (ref 150–450)
PMV BLD AUTO: 10.9 FL (ref 9.2–12.9)
POTASSIUM SERPL-SCNC: 3.8 MMOL/L (ref 3.5–5.1)
PROT SERPL-MCNC: 7 G/DL (ref 6–8.4)
RBC # BLD AUTO: 4.39 M/UL (ref 4–5.4)
SODIUM SERPL-SCNC: 137 MMOL/L (ref 136–145)
TRIGL SERPL-MCNC: 172 MG/DL (ref 30–150)
TSH SERPL DL<=0.005 MIU/L-ACNC: 2.87 UIU/ML (ref 0.4–4)
WBC # BLD AUTO: 7.84 K/UL (ref 3.9–12.7)

## 2023-04-11 PROCEDURE — 85025 COMPLETE CBC W/AUTO DIFF WBC: CPT | Performed by: FAMILY MEDICINE

## 2023-04-11 PROCEDURE — 36415 COLL VENOUS BLD VENIPUNCTURE: CPT | Performed by: FAMILY MEDICINE

## 2023-04-11 PROCEDURE — 83036 HEMOGLOBIN GLYCOSYLATED A1C: CPT | Performed by: FAMILY MEDICINE

## 2023-04-11 PROCEDURE — 80053 COMPREHEN METABOLIC PANEL: CPT | Performed by: FAMILY MEDICINE

## 2023-04-11 PROCEDURE — 84443 ASSAY THYROID STIM HORMONE: CPT | Performed by: FAMILY MEDICINE

## 2023-04-11 PROCEDURE — 80061 LIPID PANEL: CPT | Performed by: FAMILY MEDICINE

## 2023-04-11 RX ORDER — INSULIN PUMP SYRINGE, 3 ML
EACH MISCELLANEOUS
Qty: 1 EACH | Refills: 0 | Status: SHIPPED | OUTPATIENT
Start: 2023-04-11

## 2023-04-11 RX ORDER — LANCETS
EACH MISCELLANEOUS
Qty: 200 EACH | Refills: 3 | Status: SHIPPED | OUTPATIENT
Start: 2023-04-11

## 2023-04-12 ENCOUNTER — HOSPITAL ENCOUNTER (OUTPATIENT)
Dept: PREADMISSION TESTING | Facility: HOSPITAL | Age: 58
Discharge: HOME | End: 2023-04-12
Attending: INTERNAL MEDICINE
Payer: COMMERCIAL

## 2023-04-12 DIAGNOSIS — Z12.11 COLON CANCER SCREENING: Primary | ICD-10-CM

## 2023-04-12 NOTE — PROGRESS NOTES
Subjective:       Patient ID: Mini Aleman is a 57 y.o. female.    Chief Complaint: Annual Exam    Patient presents to clinic today for annual physical exam.    Review of Systems   Constitutional:  Negative for chills, fatigue, fever and unexpected weight change.   HENT:  Positive for ear pain (chronic, left ear). Negative for congestion, dental problem, hearing loss, rhinorrhea and trouble swallowing.    Eyes:  Negative for pain and visual disturbance.   Respiratory:  Negative for cough and shortness of breath.    Cardiovascular:  Positive for chest pain (new, describes as feeling her heart lately, scheduled for stress echo next week), palpitations (chronic, PVCs, occasional) and leg swelling (chronic, occasional).   Gastrointestinal:  Negative for abdominal distention, abdominal pain, blood in stool, constipation, diarrhea, nausea and vomiting.   Genitourinary:  Negative for difficulty urinating and vaginal discharge.   Musculoskeletal:  Positive for arthralgias (chronic, arthritis). Negative for myalgias.   Skin:  Negative for rash.   Neurological:  Positive for headaches (chronic, more frequently lately). Negative for dizziness, weakness and numbness.   Hematological:  Negative for adenopathy. Does not bruise/bleed easily.   Psychiatric/Behavioral:  Negative for dysphoric mood and sleep disturbance. The patient is not nervous/anxious.        Objective:      Physical Exam  Vitals reviewed.   Constitutional:       General: She is not in acute distress.     Appearance: Normal appearance. She is well-developed.   HENT:      Head: Normocephalic and atraumatic.      Right Ear: Tympanic membrane, ear canal and external ear normal.      Left Ear: Tympanic membrane, ear canal and external ear normal.      Nose: Nose normal. No mucosal edema or rhinorrhea.      Mouth/Throat:      Pharynx: Uvula midline.   Eyes:      General: Lids are normal. No scleral icterus.     Extraocular Movements: Extraocular movements intact.       Conjunctiva/sclera: Conjunctivae normal.      Pupils: Pupils are equal, round, and reactive to light.   Neck:      Thyroid: No thyromegaly.   Cardiovascular:      Rate and Rhythm: Normal rate and regular rhythm.      Heart sounds: No murmur heard.    No friction rub. No gallop.   Pulmonary:      Effort: Pulmonary effort is normal.      Breath sounds: Normal breath sounds. No wheezing, rhonchi or rales.   Abdominal:      General: Bowel sounds are normal. There is no distension.      Palpations: Abdomen is soft. There is no mass.      Tenderness: There is no abdominal tenderness.   Musculoskeletal:         General: Normal range of motion.      Cervical back: Normal range of motion and neck supple.   Lymphadenopathy:      Cervical: No cervical adenopathy.   Skin:     General: Skin is warm and dry.      Findings: No lesion or rash.      Nails: There is no clubbing.   Neurological:      Mental Status: She is alert and oriented to person, place, and time.      Cranial Nerves: No cranial nerve deficit.      Sensory: No sensory deficit.      Gait: Gait normal.   Psychiatric:         Mood and Affect: Mood and affect normal.       Assessment:       1. Routine general medical examination at a health care facility    2. Hyperlipidemia associated with type 2 diabetes mellitus    3. Hypertension associated with diabetes    4. Diabetes mellitus type 2 in obese    5. Severe obesity (BMI 35.0-39.9) with comorbidity    6. Eczema of external ear, left    7. Colon cancer screening        Plan:   1. Routine general medical examination at a health care facility    2. Hyperlipidemia associated with type 2 diabetes mellitus  Assessment & Plan:  Status pending lab, continue pravastatin     Orders:  -     Comprehensive Metabolic Panel; Future; Expected date: 04/03/2023  -     Lipid Panel; Future; Expected date: 04/03/2023    3. Hypertension associated with diabetes  Assessment & Plan:  Controlled, continue current medications      Orders:  -     TSH; Future; Expected date: 04/03/2023  -     CBC Auto Differential; Future; Expected date: 04/03/2023    4. Diabetes mellitus type 2 in obese  Assessment & Plan:  Status pending lab, continue metformin    Orders:  -     Hemoglobin A1C; Future; Expected date: 04/03/2023  -     blood-glucose meter kit; To check BG 2 times daily, to use with insurance preferred meter  Dispense: 1 each; Refill: 0  -     lancets Misc; To check BG 2 times daily, to use with insurance preferred meter  Dispense: 200 each; Refill: 3  -     blood sugar diagnostic Strp; To check BG 2 times daily, to use with insurance preferred meter  Dispense: 200 each; Refill: 3    5. Severe obesity (BMI 35.0-39.9) with comorbidity  Assessment & Plan:  Body mass index is 38.08 kg/m².    Wt Readings from Last 10 Encounters:   04/03/23 113.6 kg (250 lb 7.1 oz)   03/26/23 116.1 kg (255 lb 15.3 oz)   03/08/23 116.1 kg (256 lb)   12/02/22 113.4 kg (250 lb)   04/14/22 115.7 kg (255 lb)   04/08/22 115.7 kg (255 lb 1.2 oz)   03/11/22 115.7 kg (255 lb)   03/08/22 115.7 kg (255 lb)   03/07/22 115.7 kg (255 lb)   03/03/21 114.8 kg (252 lb 15.7 oz)           6. Eczema of external ear, left  -     fluocinolone acetonide oiL 0.01 % Drop; Place 5 drops in ear(s) 2 (two) times daily. 5 drops in affected ear bid x 7-14 days  Dispense: 20 mL; Refill: 0    7. Colon cancer screening  -     Ambulatory referral/consult to Endo Procedure ; Future; Expected date: 04/04/2023        Health Maintenance reviewed/updated.

## 2023-04-13 RX ORDER — SOD SULF/POT CHLORIDE/MAG SULF 1.479 G
12 TABLET ORAL DAILY
Qty: 24 TABLET | Refills: 0 | Status: ON HOLD | OUTPATIENT
Start: 2023-04-13 | End: 2023-07-11 | Stop reason: HOSPADM

## 2023-04-19 ENCOUNTER — PATIENT MESSAGE (OUTPATIENT)
Dept: ADMINISTRATIVE | Facility: HOSPITAL | Age: 58
End: 2023-04-19
Payer: COMMERCIAL

## 2023-05-25 ENCOUNTER — TELEPHONE (OUTPATIENT)
Dept: PREADMISSION TESTING | Facility: HOSPITAL | Age: 58
End: 2023-05-25
Payer: COMMERCIAL

## 2023-05-25 NOTE — TELEPHONE ENCOUNTER
----- Message from Sandee Ford sent at 5/25/2023  3:18 PM CDT -----  Contact: Self [phone]  Would like to receive medical advice.    Would they like a call back or a response via [facility]:  call back    Additional information:  Calling to r/s upcoming colonoscopy.

## 2023-07-10 PROBLEM — Z12.11 COLON CANCER SCREENING: Status: ACTIVE | Noted: 2023-07-10

## 2023-07-11 ENCOUNTER — HOSPITAL ENCOUNTER (OUTPATIENT)
Facility: HOSPITAL | Age: 58
Discharge: HOME | End: 2023-07-11
Attending: FAMILY MEDICINE | Admitting: FAMILY MEDICINE
Payer: COMMERCIAL

## 2023-07-11 ENCOUNTER — ANESTHESIA (OUTPATIENT)
Dept: ENDOSCOPY | Facility: HOSPITAL | Age: 58
End: 2023-07-11
Payer: COMMERCIAL

## 2023-07-11 ENCOUNTER — ANESTHESIA EVENT (OUTPATIENT)
Dept: ENDOSCOPY | Facility: HOSPITAL | Age: 58
End: 2023-07-11
Payer: COMMERCIAL

## 2023-07-11 DIAGNOSIS — Z12.11 COLON CANCER SCREENING: Primary | ICD-10-CM

## 2023-07-11 DIAGNOSIS — K64.4 EXTERNAL HEMORRHOID: ICD-10-CM

## 2023-07-11 DIAGNOSIS — K63.5 POLYP OF COLON, UNSPECIFIED PART OF COLON, UNSPECIFIED TYPE: ICD-10-CM

## 2023-07-11 PROCEDURE — 88305 TISSUE EXAM BY PATHOLOGIST: ICD-10-PCS | Mod: 26,,, | Performed by: PATHOLOGY

## 2023-07-11 PROCEDURE — 88305 TISSUE EXAM BY PATHOLOGIST: CPT | Mod: 26,,, | Performed by: PATHOLOGY

## 2023-07-11 PROCEDURE — 27201012 HC FORCEPS, HOT/COLD, DISP: Performed by: FAMILY MEDICINE

## 2023-07-11 PROCEDURE — 88305 TISSUE EXAM BY PATHOLOGIST: CPT | Performed by: PATHOLOGY

## 2023-07-11 PROCEDURE — 45380 COLONOSCOPY AND BIOPSY: CPT | Mod: PT | Performed by: FAMILY MEDICINE

## 2023-07-11 PROCEDURE — 45380 COLONOSCOPY AND BIOPSY: CPT | Mod: 33,,, | Performed by: FAMILY MEDICINE

## 2023-07-11 PROCEDURE — 25000003 PHARM REV CODE 250: Performed by: NURSE ANESTHETIST, CERTIFIED REGISTERED

## 2023-07-11 PROCEDURE — 37000008 HC ANESTHESIA 1ST 15 MINUTES: Performed by: FAMILY MEDICINE

## 2023-07-11 PROCEDURE — 63600175 PHARM REV CODE 636 W HCPCS: Performed by: NURSE ANESTHETIST, CERTIFIED REGISTERED

## 2023-07-11 PROCEDURE — 45380 PR COLONOSCOPY,BIOPSY: ICD-10-PCS | Mod: 33,,, | Performed by: FAMILY MEDICINE

## 2023-07-11 PROCEDURE — 37000009 HC ANESTHESIA EA ADD 15 MINS: Performed by: FAMILY MEDICINE

## 2023-07-11 RX ORDER — PROPOFOL 10 MG/ML
VIAL (ML) INTRAVENOUS
Status: DISCONTINUED | OUTPATIENT
Start: 2023-07-11 | End: 2023-07-11

## 2023-07-11 RX ORDER — TIRZEPATIDE 5 MG/.5ML
5 INJECTION, SOLUTION SUBCUTANEOUS
COMMUNITY

## 2023-07-11 RX ORDER — LIDOCAINE HYDROCHLORIDE 10 MG/ML
INJECTION, SOLUTION EPIDURAL; INFILTRATION; INTRACAUDAL; PERINEURAL
Status: DISCONTINUED | OUTPATIENT
Start: 2023-07-11 | End: 2023-07-11

## 2023-07-11 RX ADMIN — PROPOFOL 30 MG: 10 INJECTION, EMULSION INTRAVENOUS at 07:07

## 2023-07-11 RX ADMIN — PROPOFOL 30 MG: 10 INJECTION, EMULSION INTRAVENOUS at 08:07

## 2023-07-11 RX ADMIN — SODIUM CHLORIDE, SODIUM LACTATE, POTASSIUM CHLORIDE, AND CALCIUM CHLORIDE: .6; .31; .03; .02 INJECTION, SOLUTION INTRAVENOUS at 07:07

## 2023-07-11 RX ADMIN — PROPOFOL 80 MG: 10 INJECTION, EMULSION INTRAVENOUS at 07:07

## 2023-07-11 RX ADMIN — LIDOCAINE HYDROCHLORIDE 50 MG: 10 SOLUTION INTRAVENOUS at 07:07

## 2023-07-11 NOTE — ANESTHESIA POSTPROCEDURE EVALUATION
Anesthesia Post Evaluation    Patient: Mini Aleman    Procedure(s) Performed: Procedure(s) (LRB):  COLONOSCOPY (N/A)    Final Anesthesia Type: MAC      Patient location during evaluation: PACU  Patient participation: Yes- Able to Participate  Level of consciousness: awake and alert  Post-procedure vital signs: reviewed and stable  Pain management: adequate  Airway patency: patent    PONV status at discharge: No PONV  Anesthetic complications: no      Cardiovascular status: blood pressure returned to baseline  Respiratory status: unassisted and room air  Hydration status: euvolemic  Follow-up not needed.          Vitals Value Taken Time   /66 07/11/23 0657   Temp 36.3 °C (97.3 °F) 07/11/23 0657   Pulse 73 07/11/23 0657   Resp 18 07/11/23 0657   SpO2 96 % 07/11/23 0657         No case tracking events are documented in the log.      Pain/Neftali Score: No data recorded

## 2023-07-11 NOTE — TRANSFER OF CARE
"Anesthesia Transfer of Care Note    Patient: Mini Aleman    Procedure(s) Performed: Procedure(s) (LRB):  COLONOSCOPY (N/A)    Patient location: PACU    Anesthesia Type: MAC    Transport from OR: Transported from OR on room air with adequate spontaneous ventilation    Post pain: adequate analgesia    Post assessment: no apparent anesthetic complications    Post vital signs: stable    Level of consciousness: responds to stimulation    Nausea/Vomiting: no nausea/vomiting    Complications: none    Transfer of care protocol was followed      Last vitals:   Visit Vitals  /66 (BP Location: Left arm, Patient Position: Lying)   Pulse 73   Temp 36.3 °C (97.3 °F) (Temporal)   Resp 18   Ht 5' 8" (1.727 m)   Wt 105.7 kg (233 lb)   SpO2 96%   Breastfeeding No   BMI 35.43 kg/m²     "

## 2023-07-11 NOTE — H&P
Short Stay Endoscopy History and Physical    PCP - Rafaela Liz MD    Procedure - Colonoscopy  ASA - 2  Mallampati - per anesthesia  History of Anesthesia problems - no  Family history Anesthesia problems -  no     HPI:  This is a 58 y.o. female here for evaluation of :   Active Hospital Problems    Diagnosis  POA    *Colon cancer screening [Z12.11]  Not Applicable      Resolved Hospital Problems   No resolved problems to display.         Health Maintenance         Date Due Completion Date    Shingles Vaccine (1 of 2) Never done ---    Colorectal Cancer Screening 04/07/2022 4/7/2021    COVID-19 Vaccine (3 - Mixed Product series) 04/03/2024 (Originally 5/27/2021) 4/1/2021    HIV Screening 04/03/2029 (Originally 7/6/1978) ---    Influenza Vaccine (1) 09/01/2023 10/12/2021    Mammogram 03/08/2024 3/8/2023    Override on 9/2/2013: Done    TETANUS VACCINE 03/07/2028 3/7/2018    Lipid Panel 04/11/2028 4/11/2023            Screening - Yes  History of polyps - No     Diarrhea - no  Anemia - no  Blood in stools - no  Abdominal pain - no  Other - no    ROS:  CONSTITUTIONAL: Denies weight change,  fatigue, fevers, chills, night sweats.  CARDIOVASCULAR: Denies chest pain, shortness of breath, orthopnea and edema.  RESPIRATORY: Denies cough, hemoptysis, dyspnea, and wheezing.  GI: See HPI.    Medical History:   Past Medical History:   Diagnosis Date    Diabetes mellitus, type 2     Hyperlipidemia     Insulin resistance     Migraines     Sick sinus syndrome with tachycardia     followed by Dr. Oro, Cardiology annually       Surgical History:   Past Surgical History:   Procedure Laterality Date    OOPHORECTOMY      TOTAL ABDOMINAL HYSTERECTOMY  06/2012       Family History:   Family History   Problem Relation Age of Onset    Breast cancer Mother     Cancer Mother         non hodskins lymphoma    Diabetes Mother     Diabetes Father     Heart disease Father     Birth defects Father     Diabetes Sister     No Known Problems  Brother        Social History:   Social History     Tobacco Use    Smoking status: Never    Smokeless tobacco: Never   Substance Use Topics    Alcohol use: No    Drug use: No       Allergies:   Review of patient's allergies indicates:   Allergen Reactions    Cortisone Rash       Medications:   No current facility-administered medications on file prior to encounter.     Current Outpatient Medications on File Prior to Encounter   Medication Sig Dispense Refill    atenolol-chlorthalidone (TENORETIC) 50-25 mg Tab Take 1 tablet by mouth once daily.  1    co-enzyme Q-10 30 mg capsule Take 30 mg by mouth 3 (three) times daily.      estradioL (ESTRACE) 2 MG tablet Take 1 tablet (2 mg total) by mouth once daily. 90 tablet 4    fluocinolone acetonide oiL 0.01 % Drop Place 5 drops in ear(s) 2 (two) times daily. 5 drops in affected ear bid x 7-14 days 20 mL 0    metFORMIN (GLUCOPHAGE) 500 MG tablet Take 1 tablet (500 mg total) by mouth daily with breakfast. 90 tablet 3    pravastatin (PRAVACHOL) 10 MG tablet Take 1 tablet (10 mg total) by mouth once daily. 90 tablet 3    tirzepatide (MOUNJARO) 5 mg/0.5 mL PnIj Inject 5 mg into the skin every 7 days.      blood sugar diagnostic Strp To check BG 2 times daily, to use with insurance preferred meter 200 each 3    blood-glucose meter kit To check BG 2 times daily, to use with insurance preferred meter 1 each 0    lancets Misc To check BG 2 times daily, to use with insurance preferred meter 200 each 3    sumatriptan (IMITREX) 100 MG tablet Take 1 tablet (100 mg total) by mouth as needed. 9 tablet 1       Physical Exam:  Vital Signs:   Vitals:    07/11/23 0657   BP: 131/66   Pulse: 73   Resp: 18   Temp: 97.3 °F (36.3 °C)     General Appearance: Well appearing in no acute distress  ENT: OP clear  Chest: CTA B  CV: RRR, no m/r/g  Abd: s/nt/nd/nabs  Ext: no edema    Labs:Reviewed    IMP:  Active Hospital Problems    Diagnosis  POA    *Colon cancer screening [Z12.11]  Not Applicable       Resolved Hospital Problems   No resolved problems to display.         Plan:   I have explained the risks and benefits of colonoscopy to the patient including but not limited to bleeding, perforation, infection, and death. The patient wishes to proceed.

## 2023-07-11 NOTE — ANESTHESIA PREPROCEDURE EVALUATION
07/11/2023  Mini Aleman is a 58 y.o., female.      Pre-op Assessment    I have reviewed the Patient Summary Reports.     I have reviewed the Nursing Notes. I have reviewed the NPO Status.   I have reviewed the Medications.     Review of Systems  Anesthesia Hx:  No problems with previous Anesthesia  Denies Family Hx of Anesthesia complications.   Denies Personal Hx of Anesthesia complications.   Social:  Non-Smoker    Hematology/Oncology:  Hematology Normal   Oncology Normal     EENT/Dental:EENT/Dental Normal   Cardiovascular:   Hypertension hyperlipidemia ECG has been reviewed. Sick sinus syndrome with tachycardia   Pulmonary:  Pulmonary Normal    Renal/:  Renal/ Normal     Hepatic/GI:  Hepatic/GI Normal    Musculoskeletal:  Musculoskeletal Normal    Neurological:   Headaches    Endocrine:   Diabetes, type 2    Dermatological:  Skin Normal    Psych:  Psychiatric Normal           Physical Exam  General: Well nourished, Cooperative, Alert and Oriented    Airway:  Mallampati: II   Mouth Opening: Normal  TM Distance: Normal  Tongue: Normal  Neck ROM: Normal ROM    Dental:  Intact    Chest/Lungs:  Clear to auscultation, Normal Respiratory Rate    Heart:  Rate: Normal  Rhythm: Regular Rhythm        Anesthesia Plan  Type of Anesthesia, risks & benefits discussed:    Anesthesia Type: MAC  Intra-op Monitoring Plan: Standard ASA Monitors  Induction:  IV  Informed Consent: Informed consent signed with the Patient and all parties understand the risks and agree with anesthesia plan.  All questions answered.   ASA Score: 3  Day of Surgery Review of History & Physical: H&P Update referred to the surgeon/provider.I have interviewed and examined the patient. I have reviewed the patient's H&P dated: There are no significant changes.     Ready For Surgery From Anesthesia Perspective.     .

## 2023-07-11 NOTE — PROVATION PATIENT INSTRUCTIONS
Discharge Summary/Instructions after an Endoscopic Procedure  Patient Name: Mini Aleman  Patient MRN: [id number]  Patient YOB: 1965 Tuesday, July 11, 2023 Edwin Wen MD  Dear patient,  As a result of recent federal legislation (The Federal Cures Act), you may   receive lab or pathology results from your procedure in your [facility]   account before your physician is able to contact you. Your physician or   their representative will relay the results to you with their   recommendations at their soonest availability.  Thank you,  RESTRICTIONS:  During your procedure today, you received medications for sedation.  These   medications may affect your judgment, balance and coordination.  Therefore,   for 24 hours, you have the following restrictions:   - DO NOT drive a car, operate machinery, make legal/financial decisions,   sign important papers or drink alcohol.    ACTIVITY:  Today: no heavy lifting, straining or running due to procedural   sedation/anesthesia.  The following day: return to full activity including work.  DIET:  Eat and drink normally unless instructed otherwise.     TREATMENT FOR COMMON SIDE EFFECTS:  - Mild abdominal pain, nausea, belching, bloating or excessive gas:  rest,   eat lightly and use a heating pad.  - Sore Throat: treat with throat lozenges and/or gargle with warm salt   water.  - Because air was used during the procedure, expelling large amounts of air   from your rectum or belching is normal.  - If a bowel prep was taken, you may not have a bowel movement for 1-3 days.    This is normal.  SYMPTOMS TO WATCH FOR AND REPORT TO YOUR PHYSICIAN:  1. Abdominal pain or bloating, other than gas cramps.  2. Chest pain.  3. Back pain.  4. Signs of infection such as: chills or fever occurring within 24 hours   after the procedure.  5. Rectal bleeding, which would show as bright red, maroon, or black stools.   (A tablespoon of blood from the rectum is not serious, especially if    hemorrhoids are present.)  6. Vomiting.  7. Weakness or dizziness.  GO DIRECTLY TO THE NEAREST EMERGENCY ROOM IF YOU HAVE ANY OF THE FOLLOWING:      Difficulty breathing              Chills and/or fever over 101 F   Persistent vomiting and/or vomiting blood   Severe abdominal pain   Severe chest pain   Black, tarry stools   Bleeding- more than one tablespoon   Any other symptom or condition that you feel may need urgent attention  Your doctor recommends these additional instructions:  If any biopsies were taken, your doctors clinic will contact you in 1 to 2   weeks with any results.  - Patient has a contact number available for emergencies.  The signs and   symptoms of potential delayed complications were discussed with the   patient.  Return to normal activities tomorrow.  Written discharge   instructions were provided to the patient.   - Resume previous diet.   - Continue present medications.   - Await pathology results.   - Repeat colonoscopy in 10 years for surveillance.   - Telephone my office for pathology results in 2 weeks.   - Discharge patient to home (via wheelchair).  For questions, problems or results please call your physician Edwin Wen MD at Work:  [phone]  If you have any questions about the above instructions, call the GI   department at [phone] or call the endoscopy unit at [phone]   from 7am until 3 pm.  [facility] [place], EMERGENCY ROOM PHONE NUMBER:   [phone]  IF A COMPLICATION OR EMERGENCY SITUATION ARISES AND YOU ARE UNABLE TO REACH   YOUR PHYSICIAN - GO DIRECTLY TO THE EMERGENCY ROOM.  I have read or have had read to me these discharge instructions for my   procedure and have received a written copy.  I understand these   instructions and will follow-up with my physician if I have any questions.     __________________________________       _____________________________________  Nurse Signature                                           Patient/Designated   Responsible Party Signature  Edwin Wen MD  7/11/2023 8:20:05 AM  This report has been verified and signed electronically.  Dear patient,  As a result of recent federal legislation (The Federal Cures Act), you may   receive lab or pathology results from your procedure in your [facility]   account before your physician is able to contact you. Your physician or   their representative will relay the results to you with their   recommendations at their soonest availability.  Thank you,  PROVATION

## 2023-07-12 VITALS
RESPIRATION RATE: 17 BRPM | HEART RATE: 69 BPM | WEIGHT: 233 LBS | OXYGEN SATURATION: 95 % | SYSTOLIC BLOOD PRESSURE: 135 MMHG | TEMPERATURE: 98 F | HEIGHT: 68 IN | DIASTOLIC BLOOD PRESSURE: 64 MMHG | BODY MASS INDEX: 35.31 KG/M2

## 2023-07-13 LAB
FINAL PATHOLOGIC DIAGNOSIS: NORMAL
GROSS: NORMAL
Lab: NORMAL

## 2023-07-18 NOTE — PROGRESS NOTES
Dear Rafaela Liz MD,    I recently cared for Mini Aleman and performed an endoscopy.  Tissue was sent for pathology evaluation and I will have a letter written to ask the patient to repeat the colonoscopy in 10 years.  The pathology showed that there was only hyperplastic tissue.  Thank you for allowing me to participate in the care of your patient.  Please call me for any questions that you might have.      Dr. Edwin Wen  [phone] cell  [phone] office      NURSING STAFF:Please  inform the patient that I reviewed the recent pathology obtained at the time of colonoscopy.    The results showed that there was hyperplastic tissue present which is benign and based on that, I recommend that the patient have a repeat colonoscopy performed in 10 years.     If the patient has MyChart, this message has been sent to them.  Confirm that they read the note.  If not, copy the information and print a letter to send to the patient at this time.  Confirm that a notation to the PCP was done.      Dear Mini Aleman,    This is to inform you that I have reviewed your recent colonoscopy pathology.  The results showed that you had hyperplastic tissue present which is benign and based on that, I recommend that you have a repeat colonoscopy performed in 10 years.      Dr. Edwin Wen  [phone]

## 2023-07-19 LAB — POCT GLUCOSE: 113 MG/DL (ref 70–110)

## 2023-10-03 ENCOUNTER — PATIENT MESSAGE (OUTPATIENT)
Dept: INTERNAL MEDICINE | Facility: CLINIC | Age: 58
End: 2023-10-03

## 2023-10-03 ENCOUNTER — OFFICE VISIT (OUTPATIENT)
Dept: INTERNAL MEDICINE | Facility: CLINIC | Age: 58
End: 2023-10-03
Payer: COMMERCIAL

## 2023-10-03 VITALS
BODY MASS INDEX: 34.95 KG/M2 | TEMPERATURE: 96 F | OXYGEN SATURATION: 96 % | DIASTOLIC BLOOD PRESSURE: 72 MMHG | WEIGHT: 229.81 LBS | HEART RATE: 73 BPM | SYSTOLIC BLOOD PRESSURE: 124 MMHG

## 2023-10-03 DIAGNOSIS — I15.2 HYPERTENSION ASSOCIATED WITH DIABETES: Primary | ICD-10-CM

## 2023-10-03 DIAGNOSIS — E78.5 HYPERLIPIDEMIA ASSOCIATED WITH TYPE 2 DIABETES MELLITUS: ICD-10-CM

## 2023-10-03 DIAGNOSIS — Z23 NEED FOR INFLUENZA VACCINATION: ICD-10-CM

## 2023-10-03 DIAGNOSIS — G43.009 MIGRAINE WITHOUT AURA AND WITHOUT STATUS MIGRAINOSUS, NOT INTRACTABLE: ICD-10-CM

## 2023-10-03 DIAGNOSIS — E11.59 TYPE 2 DIABETES MELLITUS WITH OTHER CIRCULATORY COMPLICATION, WITHOUT LONG-TERM CURRENT USE OF INSULIN: ICD-10-CM

## 2023-10-03 DIAGNOSIS — E11.59 HYPERTENSION ASSOCIATED WITH DIABETES: Primary | ICD-10-CM

## 2023-10-03 DIAGNOSIS — E11.69 HYPERLIPIDEMIA ASSOCIATED WITH TYPE 2 DIABETES MELLITUS: ICD-10-CM

## 2023-10-03 PROCEDURE — 1159F PR MEDICATION LIST DOCUMENTED IN MEDICAL RECORD: ICD-10-PCS | Mod: CPTII,S$GLB,, | Performed by: PHYSICIAN ASSISTANT

## 2023-10-03 PROCEDURE — 1160F PR REVIEW ALL MEDS BY PRESCRIBER/CLIN PHARMACIST DOCUMENTED: ICD-10-PCS | Mod: CPTII,S$GLB,, | Performed by: PHYSICIAN ASSISTANT

## 2023-10-03 PROCEDURE — 99999 PR PBB SHADOW E&M-EST. PATIENT-LVL IV: CPT | Mod: PBBFAC,,, | Performed by: PHYSICIAN ASSISTANT

## 2023-10-03 PROCEDURE — 90686 IIV4 VACC NO PRSV 0.5 ML IM: CPT | Mod: S$GLB,,, | Performed by: PHYSICIAN ASSISTANT

## 2023-10-03 PROCEDURE — 99214 OFFICE O/P EST MOD 30 MIN: CPT | Mod: 25,S$GLB,, | Performed by: PHYSICIAN ASSISTANT

## 2023-10-03 PROCEDURE — 3008F PR BODY MASS INDEX (BMI) DOCUMENTED: ICD-10-PCS | Mod: CPTII,S$GLB,, | Performed by: PHYSICIAN ASSISTANT

## 2023-10-03 PROCEDURE — 3008F BODY MASS INDEX DOCD: CPT | Mod: CPTII,S$GLB,, | Performed by: PHYSICIAN ASSISTANT

## 2023-10-03 PROCEDURE — 3066F NEPHROPATHY DOC TX: CPT | Mod: CPTII,S$GLB,, | Performed by: PHYSICIAN ASSISTANT

## 2023-10-03 PROCEDURE — 90471 IMMUNIZATION ADMIN: CPT | Mod: S$GLB,,, | Performed by: PHYSICIAN ASSISTANT

## 2023-10-03 PROCEDURE — 3066F PR DOCUMENTATION OF TREATMENT FOR NEPHROPATHY: ICD-10-PCS | Mod: CPTII,S$GLB,, | Performed by: PHYSICIAN ASSISTANT

## 2023-10-03 PROCEDURE — 3078F PR MOST RECENT DIASTOLIC BLOOD PRESSURE < 80 MM HG: ICD-10-PCS | Mod: CPTII,S$GLB,, | Performed by: PHYSICIAN ASSISTANT

## 2023-10-03 PROCEDURE — 3051F HG A1C>EQUAL 7.0%<8.0%: CPT | Mod: CPTII,S$GLB,, | Performed by: PHYSICIAN ASSISTANT

## 2023-10-03 PROCEDURE — 3074F PR MOST RECENT SYSTOLIC BLOOD PRESSURE < 130 MM HG: ICD-10-PCS | Mod: CPTII,S$GLB,, | Performed by: PHYSICIAN ASSISTANT

## 2023-10-03 PROCEDURE — 90471 FLU VACCINE (QUAD) GREATER THAN OR EQUAL TO 3YO PRESERVATIVE FREE IM: ICD-10-PCS | Mod: S$GLB,,, | Performed by: PHYSICIAN ASSISTANT

## 2023-10-03 PROCEDURE — 3074F SYST BP LT 130 MM HG: CPT | Mod: CPTII,S$GLB,, | Performed by: PHYSICIAN ASSISTANT

## 2023-10-03 PROCEDURE — 1159F MED LIST DOCD IN RCRD: CPT | Mod: CPTII,S$GLB,, | Performed by: PHYSICIAN ASSISTANT

## 2023-10-03 PROCEDURE — 90686 FLU VACCINE (QUAD) GREATER THAN OR EQUAL TO 3YO PRESERVATIVE FREE IM: ICD-10-PCS | Mod: S$GLB,,, | Performed by: PHYSICIAN ASSISTANT

## 2023-10-03 PROCEDURE — 1160F RVW MEDS BY RX/DR IN RCRD: CPT | Mod: CPTII,S$GLB,, | Performed by: PHYSICIAN ASSISTANT

## 2023-10-03 PROCEDURE — 99999 PR PBB SHADOW E&M-EST. PATIENT-LVL IV: ICD-10-PCS | Mod: PBBFAC,,, | Performed by: PHYSICIAN ASSISTANT

## 2023-10-03 PROCEDURE — 3061F PR NEG MICROALBUMINURIA RESULT DOCUMENTED/REVIEW: ICD-10-PCS | Mod: CPTII,S$GLB,, | Performed by: PHYSICIAN ASSISTANT

## 2023-10-03 PROCEDURE — 3061F NEG MICROALBUMINURIA REV: CPT | Mod: CPTII,S$GLB,, | Performed by: PHYSICIAN ASSISTANT

## 2023-10-03 PROCEDURE — 3051F PR MOST RECENT HEMOGLOBIN A1C LEVEL 7.0 - < 8.0%: ICD-10-PCS | Mod: CPTII,S$GLB,, | Performed by: PHYSICIAN ASSISTANT

## 2023-10-03 PROCEDURE — 3078F DIAST BP <80 MM HG: CPT | Mod: CPTII,S$GLB,, | Performed by: PHYSICIAN ASSISTANT

## 2023-10-03 PROCEDURE — 99214 PR OFFICE/OUTPT VISIT, EST, LEVL IV, 30-39 MIN: ICD-10-PCS | Mod: 25,S$GLB,, | Performed by: PHYSICIAN ASSISTANT

## 2023-10-03 RX ORDER — SUMATRIPTAN SUCCINATE 100 MG/1
100 TABLET ORAL
Qty: 9 TABLET | Refills: 1 | Status: SHIPPED | OUTPATIENT
Start: 2023-10-03

## 2023-10-03 NOTE — PROGRESS NOTES
[address]   ACUTE REHABILITATION OCCUPATIONAL THERAPY  DAILY NOTE    Date: 21  Patient Name: Rodrigo Bullock      Room: 2612/2612-01    MRN: [id number]   : 1972  (46 y.o.)  Gender: female   Referring Practitioner: Alex Montgomery MD  Diagnosis: Intracranial hemmorrhage  Additional Pertinent Hx: 77-year-old female with history of delta storage pool disease, bipolar disorder, alcohol abuse who developed acute left-sided weaknessfound to have intraparenchymal hemorrhage, right parietal lobe hemorrhage and moderate subarachnoid hemorrhage. Pt admitted to rehab unit on 20. Restrictions  Restrictions/Precautions: Fall Risk  Other position/activity restrictions: L hypertonicity. PRAFO boot LLE in bed  Required Braces or Orthoses?: No  Equipment Used: Other, Wheelchair, Bed(Tameka Camp)    Subjective  Subjective: \"I feel like I can't keep my eyes open\".  Pt  staes after showering tasks 2* to muscle relaxer  Comments: pt cooperative and motivated for therapies  Patient Currently in Pain: Denies  Restrictions/Precautions: Fall Risk  Overall Orientation Status: Within Functional Limits  Patient Observation  Observations: intermittent L side neglect, improving but req VCs at times       Objective  Cognition  Overall Cognitive Status: WFL  Safety Judgement: Decreased awareness of need for safety  Perception  Overall Perceptual Status: WFL  Unilateral Attention: Cues to attend left visual field;Cues to attend to left side of body;Cues to maintain midline in standing  Balance  Sitting Balance: Modified independent   Standing Balance: Minimal assistance(min assist for dynamic tasks, CGA for static standing)  Bed mobility  Supine to Sit: Stand by assistance;Contact guard assistance  Transfers  Stand Step Transfers: Minimal assistance(ara walker)  Stand Pivot Transfers: Minimal assistance  Sit to stand: Minimal assistance  Stand to sit: Contact guard assistance Subjective:       Patient ID: Mini Aleman is a 58 y.o. female.    Chief Complaint: Follow-up      Patient presents to clinic today for followup of chronic conditions.        Review of Systems   Constitutional:  Negative for chills, fatigue, fever and unexpected weight change.   Eyes:  Negative for visual disturbance.   Respiratory:  Negative for shortness of breath.    Cardiovascular:  Negative for chest pain.   Musculoskeletal:  Negative for myalgias.   Neurological:  Negative for headaches.       Objective:      Physical Exam  Vitals and nursing note reviewed.   Constitutional:       General: She is not in acute distress.     Appearance: She is well-developed.   HENT:      Head: Normocephalic and atraumatic.   Eyes:      General: Lids are normal. No scleral icterus.     Extraocular Movements: Extraocular movements intact.      Conjunctiva/sclera: Conjunctivae normal.   Cardiovascular:      Rate and Rhythm: Normal rate and regular rhythm.   Pulmonary:      Effort: Pulmonary effort is normal.      Breath sounds: Normal breath sounds. No decreased breath sounds, wheezing, rhonchi or rales.   Neurological:      Mental Status: She is alert.      Cranial Nerves: No cranial nerve deficit.   Psychiatric:         Mood and Affect: Mood and affect normal.         Assessment:       1. Hypertension associated with diabetes    2. Hyperlipidemia associated with type 2 diabetes mellitus    3. Type 2 diabetes mellitus with other circulatory complication, without long-term current use of insulin    4. Need for influenza vaccination    5. Migraine without aura and without status migrainosus, not intractable        Plan:   1. Hypertension associated with diabetes  Assessment & Plan:  /72, controlled, continue atenolol-chlorthalidone    Orders:  -     TSH; Future; Expected date: 04/03/2024  -     CBC Auto Differential; Future; Expected date: 04/03/2024    2. Hyperlipidemia associated with type 2 diabetes mellitus  Assessment  & Plan:  Status pending lab, continue pravastatin    Orders:  -     Comprehensive Metabolic Panel; Future; Expected date: 04/03/2024  -     Lipid Panel; Future; Expected date: 04/03/2024    3. Type 2 diabetes mellitus with other circulatory complication, without long-term current use of insulin  Assessment & Plan:  Status pending lab, continue metformin and Mounjaro    Orders:  -     Hemoglobin A1C; Future; Expected date: 04/03/2024  -     Microalbumin/Creatinine Ratio, Urine; Future; Expected date: 04/03/2024    4. Need for influenza vaccination    5. Migraine without aura and without status migrainosus, not intractable  Overview:  Followed by Dr. Biggs, Neurology, continue with current treatment plan     Orders:  -     sumatriptan (IMITREX) 100 MG tablet; Take 1 tablet (100 mg total) by mouth as needed for Migraine.  Dispense: 9 tablet; Refill: 1    Other orders  -     Influenza - Quadrivalent (PF)        Flu today  Fasting labs ASAP  Discussed shingrix vaccine, advised can be obtained at pharmacy.  Annual with Dr. Liz scheduled with fasting labs [facility]   Health Maintenance reviewed/updated.     Transfer Comments: good handplacement and sequencing  Standing Balance  Time: AM 2 min X1, 1 min X2; PM: 3-4 min X2  Activity: AM: functional mobility/transfers, ADL tasks; PM: LUE weight bearing on tabletop  Comment: assist with LUE support when standing with ara walker, close attention to weight shifting for LLE blocking as needed. No LOB noted  Functional Mobility  Functional - Mobility Device: Hemiwalker  Activity: To/from bathroom  Assist Level: Minimal assistance  Functional Mobility Comments: assist to support LUE and block L knee laterally to decrease L foot/ankle inversion. Shower Transfers  Shower - Transfer From: Wheelchair  Shower - Transfer Type: To and From  Shower - Transfer To: Shower seat with back  Shower - Technique: Stand pivot; To right; To left  Shower Transfers: Minimal assistance  Shower Transfers Comments: assist x1, VCs and assist for LLE positioning/stabilization, cuing for control, fair/good carryover noted  Upper Extremity Function  NDT Treatment: Upper extremity     Weight Bearing  Weight Bearing Technique: Yes  LUE Weight Bearing: Extended arm standing  Response To Weight Bearing Technique: standing WB task with LUE, crossing midline to engage LUE during functional task, hand positioned off side of table for decreased wrist/digit stretch during UE extension, good tolerance noted this date, contact guard req in standing. Neuromuscular Education  Neuromuscular education: Yes  NDT Treatment: Upper extremity  Vibration: GRANADOS provided vibration to tricep and bicep with pt completing elbow flexion/extension for facilitation of increased AROM and functionality. Pt kavitha to complete flexion/extension with moderate effort completed for 10 reps X2 sets, then 10 reps without vibrations. Next GRANADOS applies vibrations to posterior forearm in attempt to allow for increased digit extension with no success. Weight Bearing  Weight Bearing Technique:  Yes LUE Weight Bearing: Extended arm standing  Response To Weight Bearing Technique: standing WB task with LUE, crossing midline to engage LUE during functional task, hand positioned off side of table for decreased wrist/digit stretch during UE extension, good tolerance noted this date, contact guard req in standing. ADL  Equipment Provided: Reacher  Feeding: None  Grooming: Modified independent ;Minimal assistance(seated at sink. assist with pony tail)  UE Bathing: Setup;Supervision  LE Bathing: Setup;Supervision(weight shifting for elizabeth area/buttocks )  UE Dressing: Verbal cueing; Increased time to complete;Contact guard assistance;Setup(intermittent assist for bra and shirt adjustments)  LE Dressing: Moderate assistance; Increased time to complete;Verbal cueing  Toileting: None  Additional Comments: GRANADOS facilitated pt in showering tasks this AM seated on tub bench. Pt completes functional mobility up to shower and is uncomfortable with stepping into shower due to L ankle weakness. LB dressing: assist for clothing mgmt while standing. VC to use keep hold on grab bar for optimal safety. Assessment  Performance deficits / Impairments: Decreased functional mobility ; Decreased ADL status; Decreased ROM; Decreased strength;Decreased endurance;Decreased balance;Decreased high-level IADLs;Decreased fine motor control;Decreased coordination;Decreased posture;Decreased safe awareness;Decreased vision/visual deficit  Prognosis: Good  Discharge Recommendations: Home with assist PRN;Patient would benefit from continued therapy after discharge  Activity Tolerance: Patient Tolerated treatment well  Safety Devices in place: Yes  Type of devices: Left in chair;Call light within reach  Equipment Recommendations  Equipment Needed: Yes  Walker: Frank-walker  Hand Held Shower: x  Transfer Tub Bench: x  Grab bars: x  Reacher: x  Long-handled Shoehorn: x Patient Education: OT POC, ara dressing technique, shower transfer safety  Patient Goals   Patient goals : To discharge home with family support  Learner:patient  Method: explanation       Outcome: verbalized concerns, demonstrated understanding and needs reinforcement        Plan  Plan  Times per week: 5-7  Times per day: Twice a day  Current Treatment Recommendations: Self-Care / ADL, Home Management Training, Strengthening, Balance Training, Functional Mobility Training, Endurance Training, Wheelchair Mobility Training, Neuromuscular Re-education, Pain Management, Safety Education & Training, Patient/Caregiver Education & Training, Equipment Evaluation, Education, & procurement, Cognitive/Perceptual Training  Patient Goals   Patient goals : To discharge home with family support  Short term goals  Time Frame for Short term goals: 7 to 10 days  Short term goal 1: Pt will perform upper body bathing/dressing with stand by assist.  Short term goal 2: Pt will perform lower body bathing and dressing with Moderate assist and Fair safety. Short term goal 3: Pt will perform toileting tasks with Moderate assist.  Short term goal 4: Pt will verbalize/demonstrate Good understanding of assistive equipment/durable medical equipment/modified techniques for increased independence with self-care and mobility. Short term goal 5: Pt will perform functional transfers with Moderate assist to toilet/wheelchair/shower with Fair safety. Short term goal 6: Pt will actively participate in 30+ minutes of therapeutic exercise/functional activities to promote increased independence with self-care and mobility. Long term goals  Time Frame for Long term goals : By discharge  Long term goal 1: Pt will perform BADLs with modified independence and Good safety with assist PRN for HORTENSIA vallese. Long term goal 2: Pt will perform functional transfers and mobility with modified independence, least restrictive mobility device, and Good safety. Long term goal 3: Pt will attend to L side of body 100% of the time during self-care, transfers, and mobility with 1-2 verbal cues PRN. Long term goal 4: Pt will stand for 5+ minutes with 1-2 UE support, modified independence and no loss of balance while engaging in functional activity of choice. Long term goal 5: Pt will verbalize/demonstrate Good understanding of home exercise program for LUE.   Long term goals 6: 9 hole peg, box and block to be assessed for LUE as appropriate         01/01/21 1455 01/01/21 1457   OT Individual Minutes   Time In 8552 1403   Time Out 0833 1435   Minutes 59 32       Electronically signed by JEAN CARLOS Upton on 1/1/21 at 2:58 PM EST

## 2023-10-09 ENCOUNTER — PATIENT OUTREACH (OUTPATIENT)
Dept: ADMINISTRATIVE | Facility: HOSPITAL | Age: 58
End: 2023-10-09
Payer: COMMERCIAL

## 2023-10-09 ENCOUNTER — PATIENT MESSAGE (OUTPATIENT)
Dept: ADMINISTRATIVE | Facility: HOSPITAL | Age: 58
End: 2023-10-09
Payer: COMMERCIAL

## 2023-10-20 ENCOUNTER — LAB VISIT (OUTPATIENT)
Dept: LAB | Facility: HOSPITAL | Age: 58
End: 2023-10-20
Attending: FAMILY MEDICINE
Payer: COMMERCIAL

## 2023-10-20 DIAGNOSIS — E11.69 HYPERLIPIDEMIA ASSOCIATED WITH TYPE 2 DIABETES MELLITUS: ICD-10-CM

## 2023-10-20 DIAGNOSIS — E66.9 DIABETES MELLITUS TYPE 2 IN OBESE: ICD-10-CM

## 2023-10-20 DIAGNOSIS — E11.69 DIABETES MELLITUS TYPE 2 IN OBESE: ICD-10-CM

## 2023-10-20 DIAGNOSIS — E78.5 HYPERLIPIDEMIA ASSOCIATED WITH TYPE 2 DIABETES MELLITUS: ICD-10-CM

## 2023-10-20 LAB
ALBUMIN SERPL BCP-MCNC: 3.6 G/DL (ref 3.5–5.2)
ALP SERPL-CCNC: 51 U/L (ref 55–135)
ALT SERPL W/O P-5'-P-CCNC: 12 U/L (ref 10–44)
ANION GAP SERPL CALC-SCNC: 12 MMOL/L (ref 8–16)
AST SERPL-CCNC: 17 U/L (ref 10–40)
BILIRUB SERPL-MCNC: 0.2 MG/DL (ref 0.1–1)
BUN SERPL-MCNC: 17 MG/DL (ref 6–20)
CALCIUM SERPL-MCNC: 9.5 MG/DL (ref 8.7–10.5)
CHLORIDE SERPL-SCNC: 102 MMOL/L (ref 95–110)
CHOLEST SERPL-MCNC: 205 MG/DL (ref 120–199)
CHOLEST/HDLC SERPL: 3.6 {RATIO} (ref 2–5)
CO2 SERPL-SCNC: 23 MMOL/L (ref 23–29)
CREAT SERPL-MCNC: 0.8 MG/DL (ref 0.5–1.4)
EST. GFR  (NO RACE VARIABLE): >60 ML/MIN/1.73 M^2
ESTIMATED AVG GLUCOSE: 114 MG/DL (ref 68–131)
GLUCOSE SERPL-MCNC: 105 MG/DL (ref 70–110)
HBA1C MFR BLD: 5.6 % (ref 4–5.6)
HDLC SERPL-MCNC: 57 MG/DL (ref 40–75)
HDLC SERPL: 27.8 % (ref 20–50)
LDLC SERPL CALC-MCNC: 124.6 MG/DL (ref 63–159)
NONHDLC SERPL-MCNC: 148 MG/DL
POTASSIUM SERPL-SCNC: 3.7 MMOL/L (ref 3.5–5.1)
PROT SERPL-MCNC: 7.1 G/DL (ref 6–8.4)
SODIUM SERPL-SCNC: 137 MMOL/L (ref 136–145)
TRIGL SERPL-MCNC: 117 MG/DL (ref 30–150)

## 2023-10-20 PROCEDURE — 80053 COMPREHEN METABOLIC PANEL: CPT | Performed by: FAMILY MEDICINE

## 2023-10-20 PROCEDURE — 36415 COLL VENOUS BLD VENIPUNCTURE: CPT | Mod: PO | Performed by: FAMILY MEDICINE

## 2023-10-20 PROCEDURE — 80061 LIPID PANEL: CPT | Performed by: FAMILY MEDICINE

## 2023-10-20 PROCEDURE — 83036 HEMOGLOBIN GLYCOSYLATED A1C: CPT | Performed by: FAMILY MEDICINE

## 2023-11-06 ENCOUNTER — TELEPHONE (OUTPATIENT)
Dept: PHARMACY | Facility: CLINIC | Age: 58
End: 2023-11-06
Payer: COMMERCIAL

## 2024-02-28 ENCOUNTER — OFFICE VISIT (OUTPATIENT)
Dept: URGENT CARE | Facility: CLINIC | Age: 59
End: 2024-02-28
Payer: COMMERCIAL

## 2024-02-28 VITALS
RESPIRATION RATE: 16 BRPM | TEMPERATURE: 98 F | WEIGHT: 233.13 LBS | DIASTOLIC BLOOD PRESSURE: 70 MMHG | HEIGHT: 66 IN | BODY MASS INDEX: 37.47 KG/M2 | HEART RATE: 67 BPM | SYSTOLIC BLOOD PRESSURE: 152 MMHG | OXYGEN SATURATION: 97 %

## 2024-02-28 DIAGNOSIS — R30.0 DYSURIA: Primary | ICD-10-CM

## 2024-02-28 DIAGNOSIS — N30.01 ACUTE CYSTITIS WITH HEMATURIA: ICD-10-CM

## 2024-02-28 LAB
BILIRUB UR QL STRIP: NEGATIVE
GLUCOSE UR QL STRIP: NEGATIVE
KETONES UR QL STRIP: NEGATIVE
LEUKOCYTE ESTERASE UR QL STRIP: NEGATIVE
PH, POC UA: 5
POC BLOOD, URINE: POSITIVE
POC NITRATES, URINE: NEGATIVE
PROT UR QL STRIP: NEGATIVE
SP GR UR STRIP: 1.01 (ref 1–1.03)
UROBILINOGEN UR STRIP-ACNC: NORMAL (ref 0.1–1.1)

## 2024-02-28 PROCEDURE — 87086 URINE CULTURE/COLONY COUNT: CPT | Performed by: PHYSICIAN ASSISTANT

## 2024-02-28 PROCEDURE — 81003 URINALYSIS AUTO W/O SCOPE: CPT | Mod: QW,S$GLB,, | Performed by: PHYSICIAN ASSISTANT

## 2024-02-28 PROCEDURE — 99213 OFFICE O/P EST LOW 20 MIN: CPT | Mod: S$GLB,,, | Performed by: PHYSICIAN ASSISTANT

## 2024-02-28 RX ORDER — SULFAMETHOXAZOLE AND TRIMETHOPRIM 800; 160 MG/1; MG/1
1 TABLET ORAL 2 TIMES DAILY
Qty: 10 TABLET | Refills: 0 | Status: SHIPPED | OUTPATIENT
Start: 2024-02-28 | End: 2024-03-04

## 2024-02-28 NOTE — PROGRESS NOTES
"Subjective:      Patient ID: Mini Aleman is a 58 y.o. female.    Vitals:  height is 5' 6.34" (1.685 m) and weight is 105.7 kg (233 lb 2.2 oz). Her tympanic temperature is 97.7 °F (36.5 °C). Her blood pressure is 152/70 (abnormal) and her pulse is 67. Her respiration is 16 and oxygen saturation is 97%.     Chief Complaint: Dysuria    Patient presents with hematuria, clotting, urinary witholding.  Symptoms started around 3am this morning.  OTC medication not taken for symptom relief.    Dysuria   This is a new problem. The current episode started today. The problem has been unchanged. The quality of the pain is described as burning (during urination). The pain is at a severity of 0/10. The patient is experiencing no pain. There has been no fever. Associated symptoms include hematuria and withholding. Pertinent negatives include no chills, flank pain, nausea or vomiting.       Constitution: Negative for chills.   Gastrointestinal:  Negative for nausea and vomiting.   Genitourinary:  Positive for dysuria and hematuria. Negative for flank pain.      Objective:     Physical Exam   HENT:   Head: Normocephalic.   Nose: Nose normal.   Cardiovascular: Normal rate and normal pulses.   Pulmonary/Chest: Effort normal. No respiratory distress. She has no wheezes.   Abdominal: Normal appearance and bowel sounds are normal. She exhibits no distension. There is no abdominal tenderness. There is no guarding, no left CVA tenderness and no right CVA tenderness.   Neurological: She is alert.   Vitals reviewed.      Assessment:     1. Dysuria    2. Acute cystitis with hematuria        Here with urgency and frequency that started at 0300 this morning. She also reports blood in her urine. She denies flank or back pain. She has never had a UTI before. She has blood in her urinalysis. I will start her on bactrim and order a urine culture. She was instructed to hydrate and return to the clinic if new or worsening symptoms occur.    Plan: "       Dysuria  -     POCT Urinalysis, Dipstick, Automated, W/O Scope  -     CULTURE, URINE    Acute cystitis with hematuria  -     sulfamethoxazole-trimethoprim 800-160mg (BACTRIM DS) 800-160 mg Tab; Take 1 tablet by mouth 2 (two) times daily. for 5 days  Dispense: 10 tablet; Refill: 0

## 2024-03-01 LAB — BACTERIA UR CULT: NO GROWTH

## 2024-03-02 ENCOUNTER — TELEPHONE (OUTPATIENT)
Dept: URGENT CARE | Facility: CLINIC | Age: 59
End: 2024-03-02
Payer: COMMERCIAL

## 2024-03-03 ENCOUNTER — TELEPHONE (OUTPATIENT)
Dept: URGENT CARE | Facility: CLINIC | Age: 59
End: 2024-03-03
Payer: COMMERCIAL

## 2024-03-06 ENCOUNTER — PATIENT MESSAGE (OUTPATIENT)
Dept: INTERNAL MEDICINE | Facility: CLINIC | Age: 59
End: 2024-03-06
Payer: COMMERCIAL

## 2024-03-18 ENCOUNTER — PATIENT OUTREACH (OUTPATIENT)
Dept: ADMINISTRATIVE | Facility: HOSPITAL | Age: 59
End: 2024-03-18
Payer: COMMERCIAL

## 2024-03-18 NOTE — PROGRESS NOTES
Care Coordination Encounter Details:       MyChart Portal Status:         [x]  Reviewed MyChart Portal Status offered / enrolled if applicable        Additional Notes:     MyChart Outcomes: Pt is enrolled & active          Updates Requested / Reviewed:        Updated Care Coordination Note         Health Maintenance Screening(s) Due:      Health Maintenance Topics Overdue:      VBHM Score: 3     Eye Exam  Foot Exam  Uncontrolled BP    Pneumonia Vaccine  Shingles/Zoster Vaccine  RSV Vaccine                  Health Maintenance Topic(s) Outreach Outcomes & Actions Taken:        Blood Pressure - Outreach Outcomes & Actions Taken  : No answer, LVM    Eye Exam - Outreach Outcomes & Actions Taken  : No answer, LVM       Additional Notes:             Chronic Disease Management:     Diabetes Measures        Lab Results   Component Value Date    HGBA1C 5.6 10/20/2023           [x]  Reviewed chart for active Diabetes diagnosis     []  Scheduled necessary follow up appointments if needed         Additional Notes:             Hypertension Measures        BP Readings from Last 1 Encounters:   02/28/24 (!) 152/70           [x]  Reviewed chart for active Hypertension diagnosis     []  Reviewed & documented Home BP Cuff     []  Documented a Remote BP if needed & applicable     []  Scheduled necessary follow up appointments with Primary Care if needed         Additional Notes:    LVM to schedule appt         Provider Team Continuity:     Last PCP Visit Date: 4/3/2023          [x]  Reviewed Primary Care Provider Visits, Annual Wellness Visit, and Future          Appointments to ensure appointments have been scheduled and/or           completed        Additional Notes:             Social Determinants of Health          [x]  Reviewed, completed, and/or updated the following sections:                  Food Insecurity, Transportation Needs, Financial Resource Strain,                 Tobacco Use        Additional Notes:             Care  Management, Digital Medicine, and/or Education Referrals    OPCM Risk Score: 18.2         Next Steps - Referral Actions: No referrals placed        Additional Notes:

## 2024-04-04 ENCOUNTER — LAB VISIT (OUTPATIENT)
Dept: LAB | Facility: HOSPITAL | Age: 59
End: 2024-04-04
Attending: PHYSICIAN ASSISTANT
Payer: COMMERCIAL

## 2024-04-04 DIAGNOSIS — E78.5 HYPERLIPIDEMIA ASSOCIATED WITH TYPE 2 DIABETES MELLITUS: ICD-10-CM

## 2024-04-04 DIAGNOSIS — E11.69 HYPERLIPIDEMIA ASSOCIATED WITH TYPE 2 DIABETES MELLITUS: ICD-10-CM

## 2024-04-04 DIAGNOSIS — E11.9 DIET-CONTROLLED TYPE 2 DIABETES MELLITUS: ICD-10-CM

## 2024-04-04 DIAGNOSIS — I15.2 HYPERTENSION ASSOCIATED WITH DIABETES: ICD-10-CM

## 2024-04-04 DIAGNOSIS — E11.59 TYPE 2 DIABETES MELLITUS WITH OTHER CIRCULATORY COMPLICATION, WITHOUT LONG-TERM CURRENT USE OF INSULIN: ICD-10-CM

## 2024-04-04 DIAGNOSIS — E11.59 HYPERTENSION ASSOCIATED WITH DIABETES: ICD-10-CM

## 2024-04-04 LAB
ALBUMIN SERPL BCP-MCNC: 3.6 G/DL (ref 3.5–5.2)
ALP SERPL-CCNC: 48 U/L (ref 55–135)
ALT SERPL W/O P-5'-P-CCNC: 11 U/L (ref 10–44)
ANION GAP SERPL CALC-SCNC: 10 MMOL/L (ref 8–16)
AST SERPL-CCNC: 13 U/L (ref 10–40)
BASOPHILS # BLD AUTO: 0.05 K/UL (ref 0–0.2)
BASOPHILS NFR BLD: 0.7 % (ref 0–1.9)
BILIRUB SERPL-MCNC: 0.3 MG/DL (ref 0.1–1)
BUN SERPL-MCNC: 16 MG/DL (ref 6–20)
CALCIUM SERPL-MCNC: 9.5 MG/DL (ref 8.7–10.5)
CHLORIDE SERPL-SCNC: 102 MMOL/L (ref 95–110)
CHOLEST SERPL-MCNC: 217 MG/DL (ref 120–199)
CHOLEST/HDLC SERPL: 3.5 {RATIO} (ref 2–5)
CO2 SERPL-SCNC: 27 MMOL/L (ref 23–29)
CREAT SERPL-MCNC: 0.8 MG/DL (ref 0.5–1.4)
DIFFERENTIAL METHOD BLD: ABNORMAL
EOSINOPHIL # BLD AUTO: 0.1 K/UL (ref 0–0.5)
EOSINOPHIL NFR BLD: 1.7 % (ref 0–8)
ERYTHROCYTE [DISTWIDTH] IN BLOOD BY AUTOMATED COUNT: 13.4 % (ref 11.5–14.5)
EST. GFR  (NO RACE VARIABLE): >60 ML/MIN/1.73 M^2
ESTIMATED AVG GLUCOSE: 120 MG/DL (ref 68–131)
GLUCOSE SERPL-MCNC: 111 MG/DL (ref 70–110)
HBA1C MFR BLD: 5.8 % (ref 4–5.6)
HCT VFR BLD AUTO: 40.8 % (ref 37–48.5)
HDLC SERPL-MCNC: 62 MG/DL (ref 40–75)
HDLC SERPL: 28.6 % (ref 20–50)
HGB BLD-MCNC: 13 G/DL (ref 12–16)
IMM GRANULOCYTES # BLD AUTO: 0.01 K/UL (ref 0–0.04)
IMM GRANULOCYTES NFR BLD AUTO: 0.1 % (ref 0–0.5)
LDLC SERPL CALC-MCNC: 112.8 MG/DL (ref 63–159)
LYMPHOCYTES # BLD AUTO: 2.6 K/UL (ref 1–4.8)
LYMPHOCYTES NFR BLD: 37.4 % (ref 18–48)
MCH RBC QN AUTO: 29.9 PG (ref 27–31)
MCHC RBC AUTO-ENTMCNC: 31.9 G/DL (ref 32–36)
MCV RBC AUTO: 94 FL (ref 82–98)
MONOCYTES # BLD AUTO: 0.4 K/UL (ref 0.3–1)
MONOCYTES NFR BLD: 6.1 % (ref 4–15)
NEUTROPHILS # BLD AUTO: 3.8 K/UL (ref 1.8–7.7)
NEUTROPHILS NFR BLD: 54 % (ref 38–73)
NONHDLC SERPL-MCNC: 155 MG/DL
NRBC BLD-RTO: 0 /100 WBC
PLATELET # BLD AUTO: 314 K/UL (ref 150–450)
PMV BLD AUTO: 10.9 FL (ref 9.2–12.9)
POTASSIUM SERPL-SCNC: 4 MMOL/L (ref 3.5–5.1)
PROT SERPL-MCNC: 6.5 G/DL (ref 6–8.4)
RBC # BLD AUTO: 4.35 M/UL (ref 4–5.4)
SODIUM SERPL-SCNC: 139 MMOL/L (ref 136–145)
TRIGL SERPL-MCNC: 211 MG/DL (ref 30–150)
TSH SERPL DL<=0.005 MIU/L-ACNC: 3.35 UIU/ML (ref 0.4–4)
WBC # BLD AUTO: 7.06 K/UL (ref 3.9–12.7)

## 2024-04-04 PROCEDURE — 80061 LIPID PANEL: CPT | Performed by: PHYSICIAN ASSISTANT

## 2024-04-04 PROCEDURE — 36415 COLL VENOUS BLD VENIPUNCTURE: CPT | Mod: PO | Performed by: PHYSICIAN ASSISTANT

## 2024-04-04 PROCEDURE — 83036 HEMOGLOBIN GLYCOSYLATED A1C: CPT | Performed by: PHYSICIAN ASSISTANT

## 2024-04-04 PROCEDURE — 80053 COMPREHEN METABOLIC PANEL: CPT | Performed by: PHYSICIAN ASSISTANT

## 2024-04-04 PROCEDURE — 84443 ASSAY THYROID STIM HORMONE: CPT | Performed by: PHYSICIAN ASSISTANT

## 2024-04-04 PROCEDURE — 85025 COMPLETE CBC W/AUTO DIFF WBC: CPT | Performed by: PHYSICIAN ASSISTANT

## 2024-04-04 RX ORDER — PRAVASTATIN SODIUM 10 MG/1
10 TABLET ORAL
Qty: 90 TABLET | Refills: 0 | Status: SHIPPED | OUTPATIENT
Start: 2024-04-04 | End: 2024-04-17

## 2024-04-04 NOTE — TELEPHONE ENCOUNTER
Provider Staff:  Action required for this patient    Requires appointment      Please see care gap opportunities below in Care Due Message.    Thanks!  [facility]     Appointments      Date Provider   Last Visit   4/3/2023 Rafaela Liz MD   Next Visit   Visit date not found Rafaela Liz MD     Refill Decision Note   Mini Aleman  is requesting a refill authorization.  Brief Assessment and Rationale for Refill:  Approve     Medication Therapy Plan:        Comments:     Note composed:12:10 PM 04/04/2024

## 2024-04-04 NOTE — TELEPHONE ENCOUNTER
Care Due:                  Date            Visit Type   Department     Provider  --------------------------------------------------------------------------------                                MYCHART                              ANNUAL                              CHECKUP/PHY  [facility]  Last Visit: 04-      S            BOB Liz  Next Visit: None Scheduled  None         None Found                                                            Last  Test          Frequency    Reason                     Performed    Due Date  --------------------------------------------------------------------------------    Office Visit  15 months..  pravastatin..............  04- 06-    [facility] Embedded Care Due Messages. Reference number: [id number].   4/04/2024 4:02:00 AM CDT

## 2024-04-05 RX ORDER — METFORMIN HYDROCHLORIDE 500 MG/1
500 TABLET ORAL
Qty: 90 TABLET | Refills: 3 | Status: SHIPPED | OUTPATIENT
Start: 2024-04-05

## 2024-04-16 ENCOUNTER — OFFICE VISIT (OUTPATIENT)
Dept: INTERNAL MEDICINE | Facility: CLINIC | Age: 59
End: 2024-04-16
Payer: COMMERCIAL

## 2024-04-16 ENCOUNTER — PATIENT MESSAGE (OUTPATIENT)
Dept: INTERNAL MEDICINE | Facility: CLINIC | Age: 59
End: 2024-04-16

## 2024-04-16 VITALS
HEIGHT: 66 IN | DIASTOLIC BLOOD PRESSURE: 72 MMHG | SYSTOLIC BLOOD PRESSURE: 124 MMHG | TEMPERATURE: 98 F | BODY MASS INDEX: 37.51 KG/M2 | RESPIRATION RATE: 20 BRPM | OXYGEN SATURATION: 98 % | WEIGHT: 233.38 LBS | HEART RATE: 60 BPM

## 2024-04-16 DIAGNOSIS — E11.69 HYPERLIPIDEMIA ASSOCIATED WITH TYPE 2 DIABETES MELLITUS: ICD-10-CM

## 2024-04-16 DIAGNOSIS — I49.5 SSS (SICK SINUS SYNDROME): ICD-10-CM

## 2024-04-16 DIAGNOSIS — E78.5 HYPERLIPIDEMIA ASSOCIATED WITH TYPE 2 DIABETES MELLITUS: ICD-10-CM

## 2024-04-16 DIAGNOSIS — I15.2 HYPERTENSION ASSOCIATED WITH DIABETES: ICD-10-CM

## 2024-04-16 DIAGNOSIS — E66.01 SEVERE OBESITY (BMI 35.0-39.9) WITH COMORBIDITY: ICD-10-CM

## 2024-04-16 DIAGNOSIS — Z00.00 ROUTINE GENERAL MEDICAL EXAMINATION AT A HEALTH CARE FACILITY: Primary | ICD-10-CM

## 2024-04-16 DIAGNOSIS — E11.59 HYPERTENSION ASSOCIATED WITH DIABETES: ICD-10-CM

## 2024-04-16 DIAGNOSIS — E11.59 TYPE 2 DIABETES MELLITUS WITH OTHER CIRCULATORY COMPLICATION, WITHOUT LONG-TERM CURRENT USE OF INSULIN: ICD-10-CM

## 2024-04-16 PROCEDURE — 3061F NEG MICROALBUMINURIA REV: CPT | Mod: CPTII,S$GLB,, | Performed by: PHYSICIAN ASSISTANT

## 2024-04-16 PROCEDURE — 3074F SYST BP LT 130 MM HG: CPT | Mod: CPTII,S$GLB,, | Performed by: PHYSICIAN ASSISTANT

## 2024-04-16 PROCEDURE — 99396 PREV VISIT EST AGE 40-64: CPT | Mod: S$GLB,,, | Performed by: PHYSICIAN ASSISTANT

## 2024-04-16 PROCEDURE — 3066F NEPHROPATHY DOC TX: CPT | Mod: CPTII,S$GLB,, | Performed by: PHYSICIAN ASSISTANT

## 2024-04-16 PROCEDURE — 3078F DIAST BP <80 MM HG: CPT | Mod: CPTII,S$GLB,, | Performed by: PHYSICIAN ASSISTANT

## 2024-04-16 PROCEDURE — 1159F MED LIST DOCD IN RCRD: CPT | Mod: CPTII,S$GLB,, | Performed by: PHYSICIAN ASSISTANT

## 2024-04-16 PROCEDURE — 1160F RVW MEDS BY RX/DR IN RCRD: CPT | Mod: CPTII,S$GLB,, | Performed by: PHYSICIAN ASSISTANT

## 2024-04-16 PROCEDURE — 3044F HG A1C LEVEL LT 7.0%: CPT | Mod: CPTII,S$GLB,, | Performed by: PHYSICIAN ASSISTANT

## 2024-04-16 PROCEDURE — 3008F BODY MASS INDEX DOCD: CPT | Mod: CPTII,S$GLB,, | Performed by: PHYSICIAN ASSISTANT

## 2024-04-16 PROCEDURE — 99999 PR PBB SHADOW E&M-EST. PATIENT-LVL V: CPT | Mod: PBBFAC,,, | Performed by: PHYSICIAN ASSISTANT

## 2024-04-16 NOTE — Clinical Note
Please assist with scheduling F lab in 3 months, I forgot to place these orders yesterday. Lives in [place], may want to do lab there. We are checking this in 3 months because we are increasing pravastatin and we have to monitor liver function and check cholesterol to see if it is even helping.

## 2024-04-16 NOTE — PROGRESS NOTES
Subjective:       Patient ID: Mini Aleman is a 58 y.o. female.    Chief Complaint: Annual Exam      Patient presents to clinic today for annual physical exam.        Review of Systems   Constitutional:  Positive for fatigue (ongoing). Negative for chills, fever and unexpected weight change.   HENT:  Positive for dental problem (sees dentist/oral surgeon/endodontist) and ear pain (chronic, intermittent, left ear). Negative for congestion, hearing loss, rhinorrhea and trouble swallowing.    Eyes:  Negative for pain and visual disturbance.   Respiratory:  Negative for cough and shortness of breath.    Cardiovascular:  Positive for leg swelling (ongoing, left leg). Negative for chest pain and palpitations.   Gastrointestinal:  Positive for abdominal pain (chronic, intermittent). Negative for abdominal distention, blood in stool, constipation, diarrhea, nausea and vomiting.   Genitourinary:  Negative for difficulty urinating and vaginal discharge.   Musculoskeletal:  Positive for arthralgias (ongoing). Negative for myalgias.   Skin:  Negative for rash.   Neurological:  Negative for dizziness, weakness, numbness and headaches.   Hematological:  Negative for adenopathy. Does not bruise/bleed easily.   Psychiatric/Behavioral:  Positive for sleep disturbance (ongoing, intermittent). Negative for dysphoric mood. The patient is not nervous/anxious.        Objective:      Physical Exam  Vitals and nursing note reviewed.   Constitutional:       General: She is not in acute distress.     Appearance: Normal appearance. She is well-developed.   HENT:      Head: Normocephalic and atraumatic.      Right Ear: Tympanic membrane, ear canal and external ear normal.      Left Ear: Tympanic membrane, ear canal and external ear normal.      Nose: Nose normal. No mucosal edema or rhinorrhea.      Mouth/Throat:      Lips: Pink.      Mouth: Mucous membranes are moist.      Pharynx: Oropharynx is clear. Uvula midline.   Eyes:      General:  Lids are normal. No scleral icterus.     Extraocular Movements: Extraocular movements intact.      Conjunctiva/sclera: Conjunctivae normal.      Pupils: Pupils are equal, round, and reactive to light.   Neck:      Thyroid: No thyromegaly.   Cardiovascular:      Rate and Rhythm: Normal rate and regular rhythm.      Pulses: Normal pulses.   Pulmonary:      Effort: Pulmonary effort is normal.      Breath sounds: Normal breath sounds. No wheezing, rhonchi or rales.   Abdominal:      General: Bowel sounds are normal. There is no distension.      Palpations: Abdomen is soft. There is no mass.      Tenderness: There is no abdominal tenderness.   Musculoskeletal:         General: Normal range of motion.      Cervical back: Normal range of motion and neck supple.      Right lower leg: No edema.      Left lower leg: No edema.   Lymphadenopathy:      Cervical: No cervical adenopathy.   Skin:     General: Skin is warm and dry.      Findings: No lesion or rash.      Nails: There is no clubbing.   Neurological:      Mental Status: She is alert.      Cranial Nerves: No cranial nerve deficit.      Sensory: No sensory deficit.   Psychiatric:         Mood and Affect: Mood and affect normal.       Protective Sensation (w/ 10 gram monofilament):  Right: Intact  Left: Intact    Visual Inspection:  Nails Intact - without Evidence of Foot Deformity- Bilateral and Callus -  Bilateral    Pedal Pulses:   Right: Present  Left: Present    Posterior Tibialis Pulses:   Right:Present  Left: Present   Assessment:       1. Routine general medical examination at a health care facility    2. Hypertension associated with diabetes    3. Hyperlipidemia associated with type 2 diabetes mellitus    4. Type 2 diabetes mellitus with other circulatory complication, without long-term current use of insulin    5. SSS (sick sinus syndrome)    6. Severe obesity (BMI 35.0-39.9) with comorbidity        Plan:   1. Routine general medical examination at a health care  facility    2. Hypertension associated with diabetes  Assessment & Plan:  /72, controlled, continue atenolol-chlorthalidone  Lab Results   Component Value Date     04/04/2024    K 4.0 04/04/2024    BUN 16 04/04/2024    CREATININE 0.8 04/04/2024    EGFRNORACEVR >60.0 04/04/2024          3. Hyperlipidemia associated with type 2 diabetes mellitus  Assessment & Plan:  LDL still not at goal, increase pravastatin from 10 mg to 20 mg, recheck labs in 3 months, diet and lifestyle changes discussed and encouraged   Lab Results   Component Value Date    CHOL 217 (H) 04/04/2024    LDLCALC 112.8 04/04/2024    TRIG 211 (H) 04/04/2024    HDL 62 04/04/2024    ALT 11 04/04/2024    AST 13 04/04/2024    ALKPHOS 48 (L) 04/04/2024        Orders:  -     Comprehensive Metabolic Panel; Future; Expected date: 10/16/2024  -     Lipid Panel; Future; Expected date: 10/16/2024  -     Comprehensive Metabolic Panel; Future; Expected date: 07/17/2024  -     Lipid Panel; Future; Expected date: 07/17/2024  -     pravastatin (PRAVACHOL) 20 MG tablet; Take 1 tablet (20 mg total) by mouth once daily.  Dispense: 90 tablet; Refill: 3    4. Type 2 diabetes mellitus with other circulatory complication, without long-term current use of insulin  Assessment & Plan:  Controlled, continue metformin and Mounjaro  Lab Results   Component Value Date    HGBA1C 5.8 (H) 04/04/2024    HGBA1C 5.6 10/20/2023    LDLCALC 112.8 04/04/2024    LABMICR 9.0 04/04/2024    CREATRANDUR 142.0 04/04/2024    MICALBCREAT 6.3 04/04/2024        Orders:  -     Hemoglobin A1C; Future; Expected date: 10/16/2024    5. SSS (sick sinus syndrome)  Overview:  Followed by Dr. Oro, Cardiology, continue with current treatment plan       6. Severe obesity (BMI 35.0-39.9) with comorbidity  Assessment & Plan:  Wt Readings from Last 10 Encounters:   04/16/24 105.8 kg (233 lb 5.7 oz)   02/28/24 105.7 kg (233 lb 2.2 oz)   10/03/23 104.2 kg (229 lb 13.3 oz)   07/11/23 105.7 kg (233 lb)    04/03/23 113.6 kg (250 lb 7.1 oz)   03/26/23 116.1 kg (255 lb 15.3 oz)   03/08/23 116.1 kg (256 lb)   12/02/22 113.4 kg (250 lb)   04/14/22 115.7 kg (255 lb)   04/08/22 115.7 kg (255 lb 1.2 oz)     Body mass index is 37.28 kg/m².  Counseled on diet and exercise.           Recent labs reviewed with patient.     Encouraged follow up with Optometry for yearly retinopathy screen  Discussed shingrix vaccine, advised can be obtained at pharmacy.  6 month f/u with Dr. Liz scheduled with fasting labs PTA   Health Maintenance reviewed/updated.

## 2024-04-17 RX ORDER — PRAVASTATIN SODIUM 20 MG/1
20 TABLET ORAL DAILY
Qty: 90 TABLET | Refills: 3 | Status: SHIPPED | OUTPATIENT
Start: 2024-04-17 | End: 2025-04-17

## 2024-04-17 NOTE — ASSESSMENT & PLAN NOTE
Controlled, continue metformin and Mounjaro  Lab Results   Component Value Date    HGBA1C 5.8 (H) 04/04/2024    HGBA1C 5.6 10/20/2023    LDLCALC 112.8 04/04/2024    LABMICR 9.0 04/04/2024    CREATRANDUR 142.0 04/04/2024    MICALBCREAT 6.3 04/04/2024       Klisyri Counseling:  I discussed with the patient the risks of Klisyri including but not limited to erythema, scaling, itching, weeping, crusting, and pain.

## 2024-04-17 NOTE — ASSESSMENT & PLAN NOTE
/72, controlled, continue atenolol-chlorthalidone  Lab Results   Component Value Date     04/04/2024    K 4.0 04/04/2024    BUN 16 04/04/2024    CREATININE 0.8 04/04/2024    EGFRNORACEVR >60.0 04/04/2024

## 2024-04-17 NOTE — ASSESSMENT & PLAN NOTE
LDL still not at goal, increase pravastatin from 10 mg to 20 mg, recheck labs in 3 months, diet and lifestyle changes discussed and encouraged   Lab Results   Component Value Date    CHOL 217 (H) 04/04/2024    LDLCALC 112.8 04/04/2024    TRIG 211 (H) 04/04/2024    HDL 62 04/04/2024    ALT 11 04/04/2024    AST 13 04/04/2024    ALKPHOS 48 (L) 04/04/2024

## 2024-04-17 NOTE — ASSESSMENT & PLAN NOTE
Wt Readings from Last 10 Encounters:   04/16/24 105.8 kg (233 lb 5.7 oz)   02/28/24 105.7 kg (233 lb 2.2 oz)   10/03/23 104.2 kg (229 lb 13.3 oz)   07/11/23 105.7 kg (233 lb)   04/03/23 113.6 kg (250 lb 7.1 oz)   03/26/23 116.1 kg (255 lb 15.3 oz)   03/08/23 116.1 kg (256 lb)   12/02/22 113.4 kg (250 lb)   04/14/22 115.7 kg (255 lb)   04/08/22 115.7 kg (255 lb 1.2 oz)     Body mass index is 37.28 kg/m².  Counseled on diet and exercise.

## 2024-06-03 ENCOUNTER — TELEPHONE (OUTPATIENT)
Dept: OPHTHALMOLOGY | Facility: CLINIC | Age: 59
End: 2024-06-03
Payer: COMMERCIAL

## 2024-06-03 NOTE — TELEPHONE ENCOUNTER
Called patient x2 and left vm in regards to next yearly diab exam with Dr. Handy. Appointment booked and slip mailed out.    .

## 2024-07-17 ENCOUNTER — LAB VISIT (OUTPATIENT)
Dept: LAB | Facility: HOSPITAL | Age: 59
End: 2024-07-17
Attending: PHYSICIAN ASSISTANT
Payer: COMMERCIAL

## 2024-07-17 DIAGNOSIS — E78.5 HYPERLIPIDEMIA ASSOCIATED WITH TYPE 2 DIABETES MELLITUS: ICD-10-CM

## 2024-07-17 DIAGNOSIS — E11.69 HYPERLIPIDEMIA ASSOCIATED WITH TYPE 2 DIABETES MELLITUS: ICD-10-CM

## 2024-07-17 LAB
ALBUMIN SERPL BCP-MCNC: 3.5 G/DL (ref 3.5–5.2)
ALP SERPL-CCNC: 45 U/L (ref 55–135)
ALT SERPL W/O P-5'-P-CCNC: 12 U/L (ref 10–44)
ANION GAP SERPL CALC-SCNC: 9 MMOL/L (ref 8–16)
AST SERPL-CCNC: 14 U/L (ref 10–40)
BILIRUB SERPL-MCNC: 0.3 MG/DL (ref 0.1–1)
BUN SERPL-MCNC: 16 MG/DL (ref 8–23)
CALCIUM SERPL-MCNC: 10 MG/DL (ref 8.7–10.5)
CHLORIDE SERPL-SCNC: 102 MMOL/L (ref 95–110)
CHOLEST SERPL-MCNC: 207 MG/DL (ref 120–199)
CHOLEST/HDLC SERPL: 3 {RATIO} (ref 2–5)
CO2 SERPL-SCNC: 28 MMOL/L (ref 23–29)
CREAT SERPL-MCNC: 0.8 MG/DL (ref 0.5–1.4)
EST. GFR  (NO RACE VARIABLE): >60 ML/MIN/1.73 M^2
GLUCOSE SERPL-MCNC: 118 MG/DL (ref 70–110)
HDLC SERPL-MCNC: 69 MG/DL (ref 40–75)
HDLC SERPL: 33.3 % (ref 20–50)
LDLC SERPL CALC-MCNC: 107 MG/DL (ref 63–159)
NONHDLC SERPL-MCNC: 138 MG/DL
POTASSIUM SERPL-SCNC: 3.6 MMOL/L (ref 3.5–5.1)
PROT SERPL-MCNC: 6.9 G/DL (ref 6–8.4)
SODIUM SERPL-SCNC: 139 MMOL/L (ref 136–145)
TRIGL SERPL-MCNC: 155 MG/DL (ref 30–150)

## 2024-07-17 PROCEDURE — 80061 LIPID PANEL: CPT | Performed by: PHYSICIAN ASSISTANT

## 2024-07-17 PROCEDURE — 80053 COMPREHEN METABOLIC PANEL: CPT | Performed by: PHYSICIAN ASSISTANT

## 2024-07-17 PROCEDURE — 36415 COLL VENOUS BLD VENIPUNCTURE: CPT | Mod: PO | Performed by: PHYSICIAN ASSISTANT

## 2024-07-19 LAB
LEFT EYE DM RETINOPATHY: NEGATIVE
RIGHT EYE DM RETINOPATHY: NEGATIVE

## 2024-07-23 DIAGNOSIS — E78.5 HYPERLIPIDEMIA ASSOCIATED WITH TYPE 2 DIABETES MELLITUS: ICD-10-CM

## 2024-07-23 DIAGNOSIS — E11.69 HYPERLIPIDEMIA ASSOCIATED WITH TYPE 2 DIABETES MELLITUS: ICD-10-CM

## 2024-07-23 RX ORDER — PRAVASTATIN SODIUM 40 MG/1
40 TABLET ORAL DAILY
Qty: 90 TABLET | Refills: 3 | Status: SHIPPED | OUTPATIENT
Start: 2024-07-23 | End: 2025-07-23

## 2024-08-01 ENCOUNTER — PATIENT OUTREACH (OUTPATIENT)
Dept: ADMINISTRATIVE | Facility: HOSPITAL | Age: 59
End: 2024-08-01
Payer: COMMERCIAL

## 2024-10-07 ENCOUNTER — PATIENT OUTREACH (OUTPATIENT)
Dept: ADMINISTRATIVE | Facility: HOSPITAL | Age: 59
End: 2024-10-07
Payer: COMMERCIAL

## 2024-10-07 NOTE — PROGRESS NOTES
VBC activation completed.    Working DM Lab Report.     Pt had lab appt scheduled 10/09/24. Appt cancelled and not rescheduled.  LM offering to reschedule labs.

## 2024-10-21 ENCOUNTER — PATIENT OUTREACH (OUTPATIENT)
Dept: ADMINISTRATIVE | Facility: HOSPITAL | Age: 59
End: 2024-10-21
Payer: COMMERCIAL

## 2024-11-27 ENCOUNTER — LAB VISIT (OUTPATIENT)
Dept: LAB | Facility: HOSPITAL | Age: 59
End: 2024-11-27
Attending: PHYSICIAN ASSISTANT
Payer: COMMERCIAL

## 2024-11-27 DIAGNOSIS — E11.59 TYPE 2 DIABETES MELLITUS WITH OTHER CIRCULATORY COMPLICATION, WITHOUT LONG-TERM CURRENT USE OF INSULIN: ICD-10-CM

## 2024-11-27 DIAGNOSIS — E78.5 HYPERLIPIDEMIA ASSOCIATED WITH TYPE 2 DIABETES MELLITUS: ICD-10-CM

## 2024-11-27 DIAGNOSIS — E11.69 HYPERLIPIDEMIA ASSOCIATED WITH TYPE 2 DIABETES MELLITUS: ICD-10-CM

## 2024-11-27 LAB
ALBUMIN SERPL BCP-MCNC: 3.6 G/DL (ref 3.5–5.2)
ALP SERPL-CCNC: 50 U/L (ref 40–150)
ALT SERPL W/O P-5'-P-CCNC: 12 U/L (ref 10–44)
ANION GAP SERPL CALC-SCNC: 9 MMOL/L (ref 8–16)
AST SERPL-CCNC: 15 U/L (ref 10–40)
BILIRUB SERPL-MCNC: 0.3 MG/DL (ref 0.1–1)
BUN SERPL-MCNC: 16 MG/DL (ref 8–23)
CALCIUM SERPL-MCNC: 9.1 MG/DL (ref 8.7–10.5)
CHLORIDE SERPL-SCNC: 102 MMOL/L (ref 95–110)
CHOLEST SERPL-MCNC: 194 MG/DL (ref 120–199)
CHOLEST/HDLC SERPL: 3.1 {RATIO} (ref 2–5)
CO2 SERPL-SCNC: 27 MMOL/L (ref 23–29)
CREAT SERPL-MCNC: 0.8 MG/DL (ref 0.5–1.4)
EST. GFR  (NO RACE VARIABLE): >60 ML/MIN/1.73 M^2
ESTIMATED AVG GLUCOSE: 128 MG/DL (ref 68–131)
GLUCOSE SERPL-MCNC: 129 MG/DL (ref 70–110)
HBA1C MFR BLD: 6.1 % (ref 4–5.6)
HDLC SERPL-MCNC: 62 MG/DL (ref 40–75)
HDLC SERPL: 32 % (ref 20–50)
LDLC SERPL CALC-MCNC: 103.8 MG/DL (ref 63–159)
NONHDLC SERPL-MCNC: 132 MG/DL
POTASSIUM SERPL-SCNC: 3.7 MMOL/L (ref 3.5–5.1)
PROT SERPL-MCNC: 6.9 G/DL (ref 6–8.4)
SODIUM SERPL-SCNC: 138 MMOL/L (ref 136–145)
TRIGL SERPL-MCNC: 141 MG/DL (ref 30–150)

## 2024-11-27 PROCEDURE — 80061 LIPID PANEL: CPT | Performed by: PHYSICIAN ASSISTANT

## 2024-11-27 PROCEDURE — 36415 COLL VENOUS BLD VENIPUNCTURE: CPT | Mod: PO | Performed by: PHYSICIAN ASSISTANT

## 2024-11-27 PROCEDURE — 80053 COMPREHEN METABOLIC PANEL: CPT | Performed by: PHYSICIAN ASSISTANT

## 2024-11-27 PROCEDURE — 83036 HEMOGLOBIN GLYCOSYLATED A1C: CPT | Performed by: PHYSICIAN ASSISTANT

## 2024-12-30 ENCOUNTER — OFFICE VISIT (OUTPATIENT)
Dept: INTERNAL MEDICINE | Facility: CLINIC | Age: 59
End: 2024-12-30
Payer: COMMERCIAL

## 2024-12-30 DIAGNOSIS — E78.5 HYPERLIPIDEMIA ASSOCIATED WITH TYPE 2 DIABETES MELLITUS: ICD-10-CM

## 2024-12-30 DIAGNOSIS — I15.2 HYPERTENSION ASSOCIATED WITH DIABETES: ICD-10-CM

## 2024-12-30 DIAGNOSIS — E11.69 HYPERLIPIDEMIA ASSOCIATED WITH TYPE 2 DIABETES MELLITUS: ICD-10-CM

## 2024-12-30 DIAGNOSIS — E11.59 TYPE 2 DIABETES MELLITUS WITH OTHER CIRCULATORY COMPLICATION, WITHOUT LONG-TERM CURRENT USE OF INSULIN: Primary | ICD-10-CM

## 2024-12-30 DIAGNOSIS — E11.59 HYPERTENSION ASSOCIATED WITH DIABETES: ICD-10-CM

## 2024-12-30 PROCEDURE — 1159F MED LIST DOCD IN RCRD: CPT | Mod: CPTII,95,, | Performed by: FAMILY MEDICINE

## 2024-12-30 PROCEDURE — 3061F NEG MICROALBUMINURIA REV: CPT | Mod: CPTII,95,, | Performed by: FAMILY MEDICINE

## 2024-12-30 PROCEDURE — 3066F NEPHROPATHY DOC TX: CPT | Mod: CPTII,95,, | Performed by: FAMILY MEDICINE

## 2024-12-30 PROCEDURE — 2023F DILAT RTA XM W/O RTNOPTHY: CPT | Mod: CPTII,95,, | Performed by: FAMILY MEDICINE

## 2024-12-30 PROCEDURE — 99214 OFFICE O/P EST MOD 30 MIN: CPT | Mod: 95,,, | Performed by: FAMILY MEDICINE

## 2024-12-30 PROCEDURE — 3044F HG A1C LEVEL LT 7.0%: CPT | Mod: CPTII,95,, | Performed by: FAMILY MEDICINE

## 2024-12-30 PROCEDURE — 1160F RVW MEDS BY RX/DR IN RCRD: CPT | Mod: CPTII,95,, | Performed by: FAMILY MEDICINE

## 2024-12-30 RX ORDER — FUROSEMIDE 20 MG/1
TABLET ORAL
COMMUNITY
Start: 2024-08-05

## 2024-12-30 RX ORDER — MELOXICAM 15 MG/1
TABLET ORAL
COMMUNITY
Start: 2024-11-20

## 2024-12-30 NOTE — PROGRESS NOTES
Subjective:       Patient ID: Mini Aleman is a 59 y.o. female.    Chief Complaint: lab results    The patient location is: work  The chief complaint leading to consultation is: follow up    Visit type: audiovisual    Face to Face time with patient: 3 minutes (chart review started 2:58 pm; video started 3:01 pm; video ended 3:04 pm)  17 minutes of total time spent on the encounter, which includes face to face time and non-face to face time preparing to see the patient (eg, review of tests), Obtaining and/or reviewing separately obtained history, Documenting clinical information in the electronic or other health record, Independently interpreting results (not separately reported) and communicating results to the patient/family/caregiver, or Care coordination (not separately reported).     Each patient to whom he or she provides medical services by telemedicine is:  (1) informed of the relationship between the physician and patient and the respective role of any other health care provider with respect to management of the patient; and (2) notified that he or she may decline to receive medical services by telemedicine and may withdraw from such care at any time.    History of Present Illness    Patient presents to followup of chronic conditions.  - Patient has been managing her diabetes with medication and lifestyle changes. She takes metformin 500 mg with breakfast and has made dietary modifications, including increased vegetable consumption and efforts to limit carbohydrate intake. Her most recent A1C result is 6.1, which is stable compared to her previous result of 5.8, placing her in the diabetic to pre-diabetic range. Patient also takes Pravastatin for cholesterol management, with levels described as stable and within acceptable limits.      Review of Systems   Constitutional:  Negative for activity change and unexpected weight change.   HENT:  Negative for hearing loss, rhinorrhea and trouble swallowing.     Eyes:  Negative for discharge and visual disturbance.   Respiratory:  Negative for chest tightness and wheezing.    Cardiovascular:  Negative for chest pain and palpitations.   Gastrointestinal:  Negative for blood in stool, constipation, diarrhea and vomiting.   Endocrine: Negative for polydipsia and polyuria.   Genitourinary:  Negative for difficulty urinating, dysuria, hematuria and menstrual problem.   Musculoskeletal:  Negative for arthralgias, joint swelling and neck pain.   Neurological:  Negative for weakness and headaches.   Psychiatric/Behavioral:  Negative for confusion and dysphoric mood.          Objective:      Physical Exam  Constitutional:       General: She is not in acute distress.     Appearance: She is well-developed.   HENT:      Head: Normocephalic and atraumatic.   Eyes:      General: Lids are normal. No scleral icterus.     Extraocular Movements: Extraocular movements intact.      Conjunctiva/sclera: Conjunctivae normal.   Pulmonary:      Effort: Pulmonary effort is normal.   Neurological:      Mental Status: She is alert and oriented to person, place, and time.   Psychiatric:         Mood and Affect: Mood and affect normal.         Assessment:       1. Type 2 diabetes mellitus with other circulatory complication, without long-term current use of insulin    2. Hyperlipidemia associated with type 2 diabetes mellitus    3. Hypertension associated with diabetes        Plan:   1. Type 2 diabetes mellitus with other circulatory complication, without long-term current use of insulin  -     Hemoglobin A1C; Future; Expected date: 06/30/2025    2. Hyperlipidemia associated with type 2 diabetes mellitus  -     Comprehensive Metabolic Panel; Future; Expected date: 06/30/2025  -     Lipid Panel; Future; Expected date: 06/30/2025    3. Hypertension associated with diabetes  -     TSH; Future; Expected date: 06/30/2025  -     CBC Auto Differential; Future; Expected date: 06/30/2025      Assessment & Plan     DIABETES TYPE 2:   Assessed A1C of 6.1, indicating controlled diabetes.   Encouraged the patient to continue with lifestyle modifications.   Continued metformin 500 mg with breakfast.   Determined current medication regimen is appropriate; no dose increase necessary at this time.   Instructed the patient to contact the office if medication refills are needed.    HYPERLIPIDEMIA:   Evaluated cholesterol levels, finding them stable and reasonable.   Continued pravastatin at current dose.   Determined current medication regimen is appropriate; no changes necessary at this time.   Instructed the patient to contact the office if medication refills are needed.    DIETARY MANAGEMENT:   Acknowledged the patient's efforts in making dietary changes, including increased vegetable consumption and reduced carbohydrate intake.   Inquired about the patient's progress with dietary changes.   Evaluated the patient's report of increased vegetable consumption and efforts to reduce carbohydrate intake.   Acknowledged the difficulty of lifestyle changes but emphasized their importance.   Encouraged the patient to continue with lifestyle modifications, particularly dietary changes.   Patient to continue current diet changes, including increased vegetable intake and reduced carbohydrate consumption.    Health Maintenance reviewed/updated.    Follow up in about 6 months (around 6/30/2025), or if symptoms worsen or fail to improve, for annual with [facility].    This note was generated with the assistance of ambient listening technology. Verbal consent was obtained by the patient and accompanying visitor(s) for the recording of patient appointment to facilitate this note. I attest to having reviewed and edited the generated note for accuracy, though some syntax or spelling errors may persist. Please contact the author of this note for any clarification.

## 2025-04-03 DIAGNOSIS — E78.5 HYPERLIPIDEMIA ASSOCIATED WITH TYPE 2 DIABETES MELLITUS: ICD-10-CM

## 2025-04-03 DIAGNOSIS — E11.9 DIET-CONTROLLED TYPE 2 DIABETES MELLITUS: ICD-10-CM

## 2025-04-03 DIAGNOSIS — E11.69 HYPERLIPIDEMIA ASSOCIATED WITH TYPE 2 DIABETES MELLITUS: ICD-10-CM

## 2025-04-04 RX ORDER — PRAVASTATIN SODIUM 40 MG/1
40 TABLET ORAL DAILY
Qty: 90 TABLET | Refills: 1 | Status: SHIPPED | OUTPATIENT
Start: 2025-04-04 | End: 2025-10-01

## 2025-04-04 RX ORDER — METFORMIN HYDROCHLORIDE 500 MG/1
500 TABLET ORAL
Qty: 90 TABLET | Refills: 1 | Status: SHIPPED | OUTPATIENT
Start: 2025-04-04

## 2025-05-01 ENCOUNTER — PATIENT OUTREACH (OUTPATIENT)
Dept: ADMINISTRATIVE | Facility: HOSPITAL | Age: 60
End: 2025-05-01
Payer: COMMERCIAL

## 2025-05-01 DIAGNOSIS — E11.59 TYPE 2 DIABETES MELLITUS WITH OTHER CIRCULATORY COMPLICATION, WITHOUT LONG-TERM CURRENT USE OF INSULIN: Primary | ICD-10-CM

## 2025-05-01 NOTE — PROGRESS NOTES
VBHM Score: 3     Urine Screening  Foot Exam    Pneumonia Vaccine  Shingles/Zoster Vaccine  RSV Vaccine            Pt has lab appt scheduled, 6/23/25. Micro albumin urine ordered and linked to appt.  Pt has follow up appt, 6/30/25. Note placed for dm foot exam.  Pt current on other health maintenance.

## 2025-06-17 ENCOUNTER — PATIENT OUTREACH (OUTPATIENT)
Dept: ADMINISTRATIVE | Facility: HOSPITAL | Age: 60
End: 2025-06-17
Payer: COMMERCIAL

## 2025-06-26 ENCOUNTER — TELEPHONE (OUTPATIENT)
Dept: INTERNAL MEDICINE | Facility: CLINIC | Age: 60
End: 2025-06-26
Payer: COMMERCIAL

## 2025-06-26 NOTE — TELEPHONE ENCOUNTER
Copied from CRM #[id number]. Topic: General Inquiry - Patient Advice  >> Jun 26, 2025  2:36 PM Sandee wrote:  .TYPE: Patient Call Back      Who called:Patient       What is the request in detail: Order request for labs.      Can the clinic reply by [facility]?         Would the patient rather a call back or a response via [facility]?call      Best call back number:.[phone] (home)

## 2025-06-27 ENCOUNTER — LAB VISIT (OUTPATIENT)
Dept: LAB | Facility: HOSPITAL | Age: 60
End: 2025-06-27
Attending: FAMILY MEDICINE
Payer: COMMERCIAL

## 2025-06-27 DIAGNOSIS — I15.2 HYPERTENSION ASSOCIATED WITH DIABETES: ICD-10-CM

## 2025-06-27 DIAGNOSIS — E78.5 HYPERLIPIDEMIA ASSOCIATED WITH TYPE 2 DIABETES MELLITUS: ICD-10-CM

## 2025-06-27 DIAGNOSIS — E11.59 HYPERTENSION ASSOCIATED WITH DIABETES: ICD-10-CM

## 2025-06-27 DIAGNOSIS — E11.69 HYPERLIPIDEMIA ASSOCIATED WITH TYPE 2 DIABETES MELLITUS: ICD-10-CM

## 2025-06-27 DIAGNOSIS — E11.59 TYPE 2 DIABETES MELLITUS WITH OTHER CIRCULATORY COMPLICATION, WITHOUT LONG-TERM CURRENT USE OF INSULIN: ICD-10-CM

## 2025-06-27 LAB
ABSOLUTE EOSINOPHIL (OHS): 0.08 K/UL
ABSOLUTE MONOCYTE (OHS): 0.48 K/UL (ref 0.3–1)
ABSOLUTE NEUTROPHIL COUNT (OHS): 4.15 K/UL (ref 1.8–7.7)
ALBUMIN SERPL BCP-MCNC: 3.4 G/DL (ref 3.5–5.2)
ALP SERPL-CCNC: 45 UNIT/L (ref 40–150)
ALT SERPL W/O P-5'-P-CCNC: 15 UNIT/L (ref 10–44)
ANION GAP (OHS): 11 MMOL/L (ref 8–16)
AST SERPL-CCNC: 14 UNIT/L (ref 11–45)
BASOPHILS # BLD AUTO: 0.05 K/UL
BASOPHILS NFR BLD AUTO: 0.7 %
BILIRUB SERPL-MCNC: 0.3 MG/DL (ref 0.1–1)
BUN SERPL-MCNC: 14 MG/DL (ref 8–23)
CALCIUM SERPL-MCNC: 8.9 MG/DL (ref 8.7–10.5)
CHLORIDE SERPL-SCNC: 99 MMOL/L (ref 95–110)
CHOLEST SERPL-MCNC: 186 MG/DL (ref 120–199)
CHOLEST/HDLC SERPL: 2.5 {RATIO} (ref 2–5)
CO2 SERPL-SCNC: 27 MMOL/L (ref 23–29)
CREAT SERPL-MCNC: 0.7 MG/DL (ref 0.5–1.4)
EAG (OHS): 143 MG/DL (ref 68–131)
ERYTHROCYTE [DISTWIDTH] IN BLOOD BY AUTOMATED COUNT: 13.1 % (ref 11.5–14.5)
GFR SERPLBLD CREATININE-BSD FMLA CKD-EPI: >60 ML/MIN/1.73/M2
GLUCOSE SERPL-MCNC: 128 MG/DL (ref 70–110)
HBA1C MFR BLD: 6.6 % (ref 4–5.6)
HCT VFR BLD AUTO: 36.6 % (ref 37–48.5)
HDLC SERPL-MCNC: 73 MG/DL (ref 40–75)
HDLC SERPL: 39.2 % (ref 20–50)
HGB BLD-MCNC: 11.9 GM/DL (ref 12–16)
IMM GRANULOCYTES # BLD AUTO: 0.01 K/UL (ref 0–0.04)
IMM GRANULOCYTES NFR BLD AUTO: 0.1 % (ref 0–0.5)
LDLC SERPL CALC-MCNC: 84.4 MG/DL (ref 63–159)
LYMPHOCYTES # BLD AUTO: 2.12 K/UL (ref 1–4.8)
MCH RBC QN AUTO: 29.6 PG (ref 27–31)
MCHC RBC AUTO-ENTMCNC: 32.5 G/DL (ref 32–36)
MCV RBC AUTO: 91 FL (ref 82–98)
NONHDLC SERPL-MCNC: 113 MG/DL
NUCLEATED RBC (/100WBC) (OHS): 0 /100 WBC
PLATELET # BLD AUTO: 287 K/UL (ref 150–450)
PMV BLD AUTO: 10.9 FL (ref 9.2–12.9)
POTASSIUM SERPL-SCNC: 3.4 MMOL/L (ref 3.5–5.1)
PROT SERPL-MCNC: 6.7 GM/DL (ref 6–8.4)
RBC # BLD AUTO: 4.02 M/UL (ref 4–5.4)
RELATIVE EOSINOPHIL (OHS): 1.2 %
RELATIVE LYMPHOCYTE (OHS): 30.8 % (ref 18–48)
RELATIVE MONOCYTE (OHS): 7 % (ref 4–15)
RELATIVE NEUTROPHIL (OHS): 60.2 % (ref 38–73)
SODIUM SERPL-SCNC: 137 MMOL/L (ref 136–145)
TRIGL SERPL-MCNC: 143 MG/DL (ref 30–150)
TSH SERPL-ACNC: 3.24 UIU/ML (ref 0.4–4)
WBC # BLD AUTO: 6.89 K/UL (ref 3.9–12.7)

## 2025-06-27 PROCEDURE — 80053 COMPREHEN METABOLIC PANEL: CPT

## 2025-06-27 PROCEDURE — 36415 COLL VENOUS BLD VENIPUNCTURE: CPT | Mod: PO

## 2025-06-27 PROCEDURE — 84443 ASSAY THYROID STIM HORMONE: CPT

## 2025-06-27 PROCEDURE — 80061 LIPID PANEL: CPT

## 2025-06-27 PROCEDURE — 83036 HEMOGLOBIN GLYCOSYLATED A1C: CPT

## 2025-06-27 PROCEDURE — 85025 COMPLETE CBC W/AUTO DIFF WBC: CPT

## 2025-06-30 ENCOUNTER — OFFICE VISIT (OUTPATIENT)
Dept: INTERNAL MEDICINE | Facility: CLINIC | Age: 60
End: 2025-06-30
Payer: COMMERCIAL

## 2025-06-30 VITALS
WEIGHT: 244.06 LBS | HEART RATE: 67 BPM | OXYGEN SATURATION: 98 % | SYSTOLIC BLOOD PRESSURE: 126 MMHG | HEIGHT: 66 IN | TEMPERATURE: 97 F | DIASTOLIC BLOOD PRESSURE: 72 MMHG | BODY MASS INDEX: 39.22 KG/M2

## 2025-06-30 DIAGNOSIS — D64.9 ANEMIA, UNSPECIFIED TYPE: ICD-10-CM

## 2025-06-30 DIAGNOSIS — Z23 NEED FOR SHINGLES VACCINE: ICD-10-CM

## 2025-06-30 DIAGNOSIS — E11.59 HYPERTENSION ASSOCIATED WITH DIABETES: ICD-10-CM

## 2025-06-30 DIAGNOSIS — E78.5 HYPERLIPIDEMIA ASSOCIATED WITH TYPE 2 DIABETES MELLITUS: ICD-10-CM

## 2025-06-30 DIAGNOSIS — I15.2 HYPERTENSION ASSOCIATED WITH DIABETES: ICD-10-CM

## 2025-06-30 DIAGNOSIS — E11.9 DIET-CONTROLLED TYPE 2 DIABETES MELLITUS: ICD-10-CM

## 2025-06-30 DIAGNOSIS — E11.69 HYPERLIPIDEMIA ASSOCIATED WITH TYPE 2 DIABETES MELLITUS: ICD-10-CM

## 2025-06-30 DIAGNOSIS — Z00.00 ROUTINE GENERAL MEDICAL EXAMINATION AT A HEALTH CARE FACILITY: Primary | ICD-10-CM

## 2025-06-30 DIAGNOSIS — E66.01 SEVERE OBESITY (BMI 35.0-39.9) WITH COMORBIDITY: ICD-10-CM

## 2025-06-30 DIAGNOSIS — I49.5 SSS (SICK SINUS SYNDROME): ICD-10-CM

## 2025-06-30 DIAGNOSIS — E11.59 TYPE 2 DIABETES MELLITUS WITH OTHER CIRCULATORY COMPLICATION, WITHOUT LONG-TERM CURRENT USE OF INSULIN: ICD-10-CM

## 2025-06-30 PROCEDURE — 90750 HZV VACC RECOMBINANT IM: CPT | Mod: S$GLB,,, | Performed by: PHYSICIAN ASSISTANT

## 2025-06-30 PROCEDURE — 3044F HG A1C LEVEL LT 7.0%: CPT | Mod: CPTII,S$GLB,, | Performed by: PHYSICIAN ASSISTANT

## 2025-06-30 PROCEDURE — 3078F DIAST BP <80 MM HG: CPT | Mod: CPTII,S$GLB,, | Performed by: PHYSICIAN ASSISTANT

## 2025-06-30 PROCEDURE — 1159F MED LIST DOCD IN RCRD: CPT | Mod: CPTII,S$GLB,, | Performed by: PHYSICIAN ASSISTANT

## 2025-06-30 PROCEDURE — 99999 PR PBB SHADOW E&M-EST. PATIENT-LVL V: CPT | Mod: PBBFAC,,, | Performed by: PHYSICIAN ASSISTANT

## 2025-06-30 PROCEDURE — 1160F RVW MEDS BY RX/DR IN RCRD: CPT | Mod: CPTII,S$GLB,, | Performed by: PHYSICIAN ASSISTANT

## 2025-06-30 PROCEDURE — 90471 IMMUNIZATION ADMIN: CPT | Mod: S$GLB,,, | Performed by: PHYSICIAN ASSISTANT

## 2025-06-30 PROCEDURE — 99396 PREV VISIT EST AGE 40-64: CPT | Mod: 25,S$GLB,, | Performed by: PHYSICIAN ASSISTANT

## 2025-06-30 PROCEDURE — 3074F SYST BP LT 130 MM HG: CPT | Mod: CPTII,S$GLB,, | Performed by: PHYSICIAN ASSISTANT

## 2025-06-30 PROCEDURE — 3008F BODY MASS INDEX DOCD: CPT | Mod: CPTII,S$GLB,, | Performed by: PHYSICIAN ASSISTANT

## 2025-06-30 RX ORDER — METFORMIN HYDROCHLORIDE 500 MG/1
500 TABLET ORAL 2 TIMES DAILY WITH MEALS
Qty: 90 TABLET | Refills: 3 | Status: SHIPPED | OUTPATIENT
Start: 2025-06-30

## 2025-06-30 RX ORDER — ROSUVASTATIN CALCIUM 40 MG/1
40 TABLET, COATED ORAL NIGHTLY
Qty: 90 TABLET | Refills: 3 | Status: SHIPPED | OUTPATIENT
Start: 2025-06-30 | End: 2026-06-30

## 2025-06-30 NOTE — PROGRESS NOTES
Subjective:       Patient ID: Mini Aleman is a 60 y.o. female.    Chief Complaint: Annual Exam    CHIEF COMPLAINT:  - Mini presents for an annual wellness visit and to discuss recent lab results.    HPI:  - Mini's A1C has increased to 6.6. She previously took Mounjaro for diabetes management but discontinued it due to severe side effects, including significant nausea. She describes her eating habits, mentioning that she avoids fried foods and has been attempting to reduce complex carbohydrate intake, though she still consumes small amounts of rice and pasta. She acknowledges her diet is moderate, neither exemplary nor poor. Her main dietary issue is a preference for salty foods, particularly chips. She has eliminated MSG from her diet due to it triggering migraines, which has significantly reduced her migraine frequency. She reports occasional cramps, which she initially suspected might be medication-related. She maintains constant hydration, ensuring water availability in various locations including her vehicle. Her left foot swells when she travels, for which she was previously prescribed Lasix. She had dehydration the day after taking it while traveling. She also has cerumen buildup in her ear and difficulty removing it.  - She denies consuming many sweets.    MEDICATIONS:  - Metformin 500 mg, daily at night, for diabetes  - Pravastatin, for cholesterol, taken at night  - Lasix (furosemide), as needed, for foot swelling when traveling  - Discontinued Mounjaro (tirzepatide) due to side effect of nausea    MEDICAL HISTORY:  - Diabetes  - Migraines  - Heart disease  - Shingles vaccine, first dose received recently    ALLERGIES:  - MSG: migraines           Review of Systems   Constitutional:  Negative for chills, fatigue, fever and unexpected weight change.   HENT:  Negative for congestion, dental problem, ear pain, hearing loss, rhinorrhea and trouble swallowing.    Eyes:  Negative for pain and visual  disturbance.   Respiratory:  Negative for cough and shortness of breath.    Cardiovascular:  Negative for chest pain, palpitations and leg swelling.   Gastrointestinal:  Negative for abdominal distention, abdominal pain, blood in stool, constipation, diarrhea, nausea and vomiting.   Genitourinary:  Negative for difficulty urinating and pelvic pain.   Musculoskeletal:  Negative for arthralgias and myalgias.   Skin:  Negative for rash.   Neurological:  Negative for dizziness, weakness, numbness and headaches.   Hematological:  Negative for adenopathy. Does not bruise/bleed easily.   Psychiatric/Behavioral:  Negative for dysphoric mood and sleep disturbance. The patient is not nervous/anxious.        Objective:   Laboratory Reviewed ({Yes)    Lab Results   Component Value Date     06/27/2025    K 3.4 (L) 06/27/2025    CL 99 06/27/2025    CO2 27 06/27/2025     (H) 06/27/2025    BUN 14 06/27/2025    CREATININE 0.7 06/27/2025    CALCIUM 8.9 06/27/2025    PROT 6.7 06/27/2025    ALBUMIN 3.4 (L) 06/27/2025    BILITOT 0.3 06/27/2025    ALKPHOS 45 06/27/2025    AST 14 06/27/2025    ALT 15 06/27/2025    EGFRNORACEVR >60 06/27/2025    ANIONGAP 11 06/27/2025       Lab Results   Component Value Date    CHOL 186 06/27/2025    TRIG 143 06/27/2025    HDL 73 06/27/2025    LDLCALC 84.4 06/27/2025       Lab Results   Component Value Date    WBC 6.89 06/27/2025    RBC 4.02 06/27/2025    HGB 11.9 (L) 06/27/2025    HCT 36.6 (L) 06/27/2025    MCV 91 06/27/2025     06/27/2025    GRAN 3.8 04/04/2024    GRAN 54.0 04/04/2024    LYMPH 30.8 06/27/2025    LYMPH 2.12 06/27/2025    MONO 7.0 06/27/2025    MONO 0.48 06/27/2025    EOSINOPHIL 1.7 04/04/2024    BASOPHIL 0.7 06/27/2025    BASOPHIL 0.05 06/27/2025       Lab Results   Component Value Date    TSH 3.236 06/27/2025       Lab Results   Component Value Date    HGBA1C 6.6 (H) 06/27/2025       Lab Results   Component Value Date    LABMICR 9.0 04/04/2024    CREATRANDUR 142.0  "04/04/2024    MICALBCREAT 6.3 04/04/2024       No results found for: "[id number]"      Physical Exam  Vitals reviewed.   Constitutional:       General: She is not in acute distress.     Appearance: Normal appearance. She is well-developed. She is obese. She is not ill-appearing or toxic-appearing.   HENT:      Head: Normocephalic and atraumatic.      Right Ear: Tympanic membrane, ear canal and external ear normal.      Left Ear: Tympanic membrane, ear canal and external ear normal.      Nose: Nose normal.      Mouth/Throat:      Mouth: Mucous membranes are dry.      Pharynx: Oropharynx is clear.   Eyes:      General: Lids are normal. No scleral icterus.     Extraocular Movements: Extraocular movements intact.      Conjunctiva/sclera: Conjunctivae normal.      Pupils: Pupils are equal, round, and reactive to light.   Cardiovascular:      Rate and Rhythm: Normal rate and regular rhythm.      Pulses: Normal pulses.      Heart sounds: Normal heart sounds. No murmur heard.     No friction rub. No gallop.   Pulmonary:      Effort: Pulmonary effort is normal. No respiratory distress.      Breath sounds: Normal breath sounds. No stridor. No decreased breath sounds, wheezing, rhonchi or rales.   Abdominal:      General: Abdomen is flat. Bowel sounds are normal. There is no distension.      Palpations: Abdomen is soft. There is no mass.      Tenderness: There is no abdominal tenderness. There is no guarding or rebound.      Hernia: No hernia is present.   Musculoskeletal:         General: Normal range of motion.      Cervical back: Normal range of motion and neck supple. No tenderness.      Right lower leg: No edema.      Left lower leg: No edema.   Lymphadenopathy:      Cervical: No cervical adenopathy.   Skin:     General: Skin is warm and dry.   Neurological:      General: No focal deficit present.      Mental Status: She is alert and oriented to person, place, and time. Mental status is at baseline.      Cranial Nerves: " No cranial nerve deficit.      Gait: Gait normal.   Psychiatric:         Mood and Affect: Mood and affect normal.         Behavior: Behavior normal.         Thought Content: Thought content normal.         Judgment: Judgment normal.         Assessment:       1. Routine general medical examination at a health care facility    2. Hyperlipidemia associated with type 2 diabetes mellitus    3. Hypertension associated with diabetes    4. SSS (sick sinus syndrome)    5. Severe obesity (BMI 35.0-39.9) with comorbidity    6. Type 2 diabetes mellitus with other circulatory complication, without long-term current use of insulin    7. Need for shingles vaccine    8. Anemia, unspecified type    9. Diet-controlled type 2 diabetes mellitus        Plan:   1. Routine general medical examination at a health care facility    2. Hyperlipidemia associated with type 2 diabetes mellitus  -     Comprehensive Metabolic Panel; Future; Expected date: 12/30/2025  -     Lipid Panel; Future; Expected date: 12/30/2025  -     rosuvastatin (CRESTOR) 40 MG Tab; Take 1 tablet (40 mg total) by mouth every evening.  Dispense: 90 tablet; Refill: 3    3. Hypertension associated with diabetes    4. SSS (sick sinus syndrome)  Overview:  Followed by Dr. Oro, Cardiology, continue with current treatment plan       5. Severe obesity (BMI 35.0-39.9) with comorbidity    6. Type 2 diabetes mellitus with other circulatory complication, without long-term current use of insulin  -     Hemoglobin A1C; Future; Expected date: 12/30/2025  -     Microalbumin/Creatinine Ratio, Urine; Future; Expected date: 12/30/2025    7. Need for shingles vaccine  -     varicella zoster (Shingrix) IM vaccine (>/= 51 yo)    8. Anemia, unspecified type  -     CBC Auto Differential; Future; Expected date: 12/30/2025    9. Diet-controlled type 2 diabetes mellitus  -     metFORMIN (GLUCOPHAGE) 500 MG tablet; Take 1 tablet (500 mg total) by mouth 2 (two) times daily with meals.  Dispense:  90 tablet; Refill: 3          Assessment & Plan    IMPRESSION:  - A1C increased to 6.6, requiring adjustment in diabetes management.  - Cholesterol levels require more aggressive treatment given diabetic status.  - Mild anemia warrants monitoring.  - Slightly low potassium levels, potentially due to as-needed Lasix use.  - earwax buildup present.    TYPE 2 DIABETES MELLITUS WITH OTHER CIRCULATORY COMPLICATIONS:   Monitored patient's A1C, which has increased to 6.6%, indicating a need for better glycemic control.   Due to reported intolerance to Mounjaro, increased Metformin to 500 mg twice daily (morning and evening).   Discussed potential side effects, including temporary diarrhea which typically resolves within 1-2 weeks; advised using Imodium as needed for GI symptoms.   Explained cholesterol guidelines for diabetic patients, emphasizing the need for lower LDL targets (closer to 70) due to increased cardiovascular risk.   Instructed patient to reduce complex carbohydrate intake to improve overall glycemic control.    HYPERLIPIDEMIA:   Switched from Pravastatin to Rosuvastatin (Crestor) 40 mg daily in the evening with a large glass of water for more effective cholesterol management.   Advised on potential side effect of Rosuvastatin (widespread muscle cramps), instructing to discontinue and report if experienced.   Recommend dietary modifications including consuming salmon once weekly, limiting red meat consumption, and increasing poultry and seafood intake.    CHRONIC MIGRAINE:   Noted the patient has eliminated MSG from diet to help manage migraines.    LOCALIZED EDEMA:   Prescribed Lasix for swelling in left foot during travel.    ANEMIA:   Evaluated lab results showing mild anemia with slightly low hemoglobin and hematocrit.   Recommend taking a daily multivitamin with iron.   Planned to recheck hemoglobin and hematocrit in 6 months.    HYPOKALEMIA:   Evaluated potassium level and found it slightly low, likely  due to Lasix use.    IMPACTED CERUMEN:   Examined ears and found wax covering approximately half of the ear canal, not totally occluding.   Recommend using Debrox ear drops to soften wax.    DIABETES MANAGEMENT:   Continue taking metformin, advised on dietary and lifestyle modifications/recommendations.     PREVENTIVE CARE:   Informed about potential arm soreness for a few days following shingles vaccine.    FOLLOW-UP:   Ordered lab work for 6-month follow-up.           Health Maintenance reviewed/updated.      Check-out note:  Please follow up in 6 months for 6 month f/u In-person with Dr. Liz.  Labs: Fasting lab PTA  Additional orders: Print AVS, shingles shot today      This note was generated with the assistance of ambient listening technology. Verbal consent was obtained by the patient and accompanying visitor(s) for the recording of patient appointment to facilitate this note. I attest to having reviewed and edited the generated note for accuracy, though some syntax or spelling errors may persist. Please contact the author of this note for any clarification.

## 2025-06-30 NOTE — PATIENT INSTRUCTIONS
You can get your Pneumococcal (pneumonia) vaccine at the pharmacy.      You can get your RSV vaccine at the pharmacy.